# Patient Record
Sex: MALE | HISPANIC OR LATINO | Employment: OTHER | URBAN - NONMETROPOLITAN AREA
[De-identification: names, ages, dates, MRNs, and addresses within clinical notes are randomized per-mention and may not be internally consistent; named-entity substitution may affect disease eponyms.]

---

## 2020-10-27 ENCOUNTER — OFFICE VISIT (OUTPATIENT)
Dept: ENDOCRINOLOGY | Facility: CLINIC | Age: 45
End: 2020-10-27

## 2020-10-27 VITALS
BODY MASS INDEX: 35.49 KG/M2 | SYSTOLIC BLOOD PRESSURE: 122 MMHG | WEIGHT: 247.9 LBS | HEIGHT: 70 IN | OXYGEN SATURATION: 99 % | HEART RATE: 65 BPM | DIASTOLIC BLOOD PRESSURE: 82 MMHG

## 2020-10-27 DIAGNOSIS — N52.9 ERECTILE DYSFUNCTION, UNSPECIFIED ERECTILE DYSFUNCTION TYPE: ICD-10-CM

## 2020-10-27 DIAGNOSIS — E29.1 HYPOGONADISM IN MALE: Primary | ICD-10-CM

## 2020-10-27 DIAGNOSIS — N46.11 OLIGOSPERMIA: ICD-10-CM

## 2020-10-27 PROCEDURE — 99204 OFFICE O/P NEW MOD 45 MIN: CPT | Performed by: INTERNAL MEDICINE

## 2020-10-27 RX ORDER — AMLODIPINE BESYLATE 10 MG/1
10 TABLET ORAL DAILY
COMMUNITY

## 2020-10-27 RX ORDER — TADALAFIL 20 MG/1
20 TABLET ORAL DAILY PRN
Qty: 30 TABLET | Refills: 11 | Status: SHIPPED | OUTPATIENT
Start: 2020-10-27 | End: 2021-08-25 | Stop reason: SDUPTHER

## 2020-10-27 RX ORDER — METHOCARBAMOL 500 MG/1
500 TABLET, FILM COATED ORAL 4 TIMES DAILY
COMMUNITY
End: 2021-12-16 | Stop reason: SDUPTHER

## 2020-10-27 RX ORDER — TRAZODONE HYDROCHLORIDE 100 MG/1
100 TABLET ORAL NIGHTLY
COMMUNITY
End: 2020-12-21

## 2020-10-27 RX ORDER — PRAZOSIN HYDROCHLORIDE 5 MG/1
5 CAPSULE ORAL NIGHTLY
COMMUNITY
End: 2022-11-21 | Stop reason: SDUPTHER

## 2020-10-27 RX ORDER — SERTRALINE HYDROCHLORIDE 100 MG/1
100 TABLET, FILM COATED ORAL DAILY
COMMUNITY
End: 2022-08-25 | Stop reason: SDUPTHER

## 2020-10-27 RX ORDER — SUMATRIPTAN 100 MG/1
100 TABLET, FILM COATED ORAL
COMMUNITY
End: 2021-08-25 | Stop reason: SDUPTHER

## 2020-10-27 RX ORDER — DIPHENOXYLATE HYDROCHLORIDE AND ATROPINE SULFATE 2.5; .025 MG/1; MG/1
1 TABLET ORAL DAILY
COMMUNITY
End: 2022-11-21 | Stop reason: SDUPTHER

## 2020-10-27 RX ORDER — IBUPROFEN 600 MG/1
600 TABLET ORAL EVERY 6 HOURS PRN
COMMUNITY
End: 2021-08-25 | Stop reason: SDUPTHER

## 2020-10-27 RX ORDER — HYDROXYZINE HYDROCHLORIDE 25 MG/1
25 TABLET, FILM COATED ORAL 3 TIMES DAILY PRN
COMMUNITY
End: 2022-11-21 | Stop reason: SDUPTHER

## 2020-10-27 RX ORDER — LISINOPRIL 20 MG/1
20 TABLET ORAL DAILY
COMMUNITY
End: 2022-04-28 | Stop reason: SDUPTHER

## 2020-10-29 ENCOUNTER — TELEPHONE (OUTPATIENT)
Dept: ENDOCRINOLOGY | Facility: CLINIC | Age: 45
End: 2020-10-29

## 2020-11-30 PROBLEM — N46.11 OLIGOSPERMIA: Status: ACTIVE | Noted: 2020-11-30

## 2020-11-30 PROBLEM — I10 ESSENTIAL HYPERTENSION: Status: ACTIVE | Noted: 2020-11-30

## 2020-11-30 PROBLEM — E29.1 HYPOGONADISM IN MALE: Status: ACTIVE | Noted: 2020-11-30

## 2020-12-15 ENCOUNTER — LAB (OUTPATIENT)
Dept: LAB | Facility: HOSPITAL | Age: 45
End: 2020-12-15

## 2020-12-15 DIAGNOSIS — N46.11 OLIGOSPERMIA: ICD-10-CM

## 2020-12-15 LAB
ALBUMIN SERPL-MCNC: 4.4 G/DL (ref 3.5–5.2)
ALBUMIN/GLOB SERPL: 1.4 G/DL
ALP SERPL-CCNC: 76 U/L (ref 39–117)
ALT SERPL W P-5'-P-CCNC: 44 U/L (ref 1–41)
ANION GAP SERPL CALCULATED.3IONS-SCNC: 9.2 MMOL/L (ref 5–15)
AST SERPL-CCNC: 26 U/L (ref 1–40)
BILIRUB SERPL-MCNC: 0.2 MG/DL (ref 0–1.2)
BUN SERPL-MCNC: 17 MG/DL (ref 6–20)
BUN/CREAT SERPL: 18.1 (ref 7–25)
CALCIUM SPEC-SCNC: 9.2 MG/DL (ref 8.6–10.5)
CHLORIDE SERPL-SCNC: 104 MMOL/L (ref 98–107)
CO2 SERPL-SCNC: 26.8 MMOL/L (ref 22–29)
CREAT SERPL-MCNC: 0.94 MG/DL (ref 0.76–1.27)
FSH SERPL-ACNC: 2.38 MIU/ML
GFR SERPL CREATININE-BSD FRML MDRD: 105 ML/MIN/1.73
GFR SERPL CREATININE-BSD FRML MDRD: 87 ML/MIN/1.73
GLOBULIN UR ELPH-MCNC: 3.2 GM/DL
GLUCOSE SERPL-MCNC: 130 MG/DL (ref 65–99)
HCT VFR BLD AUTO: 44.3 % (ref 37.5–51)
LH SERPL-ACNC: 3.63 MIU/ML
POTASSIUM SERPL-SCNC: 4.3 MMOL/L (ref 3.5–5.2)
PROLACTIN SERPL-MCNC: 14.1 NG/ML (ref 4.04–15.2)
PROT SERPL-MCNC: 7.6 G/DL (ref 6–8.5)
SODIUM SERPL-SCNC: 140 MMOL/L (ref 136–145)
TESTOST SERPL-MCNC: 220 NG/DL (ref 249–836)

## 2020-12-15 PROCEDURE — 84146 ASSAY OF PROLACTIN: CPT | Performed by: INTERNAL MEDICINE

## 2020-12-15 PROCEDURE — 89320 SEMEN ANAL VOL/COUNT/MOT: CPT

## 2020-12-15 PROCEDURE — 80053 COMPREHEN METABOLIC PANEL: CPT | Performed by: INTERNAL MEDICINE

## 2020-12-15 PROCEDURE — 84410 TESTOSTERONE BIOAVAILABLE: CPT | Performed by: INTERNAL MEDICINE

## 2020-12-15 PROCEDURE — 36415 COLL VENOUS BLD VENIPUNCTURE: CPT | Performed by: INTERNAL MEDICINE

## 2020-12-15 PROCEDURE — 85014 HEMATOCRIT: CPT | Performed by: INTERNAL MEDICINE

## 2020-12-15 PROCEDURE — 83001 ASSAY OF GONADOTROPIN (FSH): CPT | Performed by: INTERNAL MEDICINE

## 2020-12-15 PROCEDURE — 84270 ASSAY OF SEX HORMONE GLOBUL: CPT | Performed by: INTERNAL MEDICINE

## 2020-12-15 PROCEDURE — 83002 ASSAY OF GONADOTROPIN (LH): CPT | Performed by: INTERNAL MEDICINE

## 2020-12-15 PROCEDURE — 84403 ASSAY OF TOTAL TESTOSTERONE: CPT | Performed by: INTERNAL MEDICINE

## 2020-12-16 LAB
CHARACTER SMN: NORMAL
LIQUEFACTION TIME SMN: 60 MINUTES (ref 0–60)
PH SMN: 8 [PH] (ref 6–8)
SEX ABSTIN DURATION TIME PATIENT: NORMAL DAYS
SHBG SERPL-SCNC: 9.3 NMOL/L (ref 16.5–55.9)
SPECIMEN VOL SMN: 1.5 ML (ref 1.5–5)
SPERM # SMN: 148 MILLIONS/ML (ref 60–150)
SPERM MOTILE NFR SMN: 90 % MOTILE (ref 60–100)
SPERM SMN: 71 % NORMAL (ref 70–100)
VISC SMN: NORMAL CP

## 2020-12-21 ENCOUNTER — OFFICE VISIT (OUTPATIENT)
Dept: ENDOCRINOLOGY | Facility: CLINIC | Age: 45
End: 2020-12-21

## 2020-12-21 VITALS
HEIGHT: 70 IN | BODY MASS INDEX: 36.09 KG/M2 | OXYGEN SATURATION: 98 % | DIASTOLIC BLOOD PRESSURE: 86 MMHG | WEIGHT: 252.1 LBS | HEART RATE: 74 BPM | SYSTOLIC BLOOD PRESSURE: 118 MMHG

## 2020-12-21 DIAGNOSIS — R79.89 LOW TESTOSTERONE IN MALE: Primary | ICD-10-CM

## 2020-12-21 DIAGNOSIS — G47.33 OSA (OBSTRUCTIVE SLEEP APNEA): ICD-10-CM

## 2020-12-21 PROCEDURE — 99214 OFFICE O/P EST MOD 30 MIN: CPT | Performed by: INTERNAL MEDICINE

## 2020-12-21 RX ORDER — MIRTAZAPINE 15 MG/1
15 TABLET, FILM COATED ORAL NIGHTLY
COMMUNITY
End: 2022-11-21 | Stop reason: SDUPTHER

## 2020-12-21 NOTE — PROGRESS NOTES
Hank Peacock is a 45 y.o. male who presents for  evaluation of   Chief Complaint   Patient presents with   • Follow-up     2 mo jaja        Referring provider      Primary Care Provider    Karuna Doty MD     Male Hypogonadism       Duration - 5 years     Quality - Testosterone Fasting , 8 am , 200 , repeat 220     Current symptoms/problems - Fatigue, Weakness, low libido , ED, weight gain     Alleviating Factors - none    Aggravating Factors - none       Past Medical History:   Diagnosis Date   • Essential hypertension 11/30/2020   • Hypogonadism in male 11/30/2020   • Oligospermia 11/30/2020     Family History   Problem Relation Age of Onset   • Diabetes Mother      Social History     Tobacco Use   • Smoking status: Never Smoker   • Smokeless tobacco: Never Used   Substance Use Topics   • Alcohol use: Not on file   • Drug use: Not on file         Current Outpatient Medications:   •  amLODIPine (NORVASC) 10 MG tablet, Take 10 mg by mouth Daily., Disp: , Rfl:   •  hydrOXYzine (ATARAX) 25 MG tablet, Take 25 mg by mouth 3 (Three) Times a Day As Needed for Itching., Disp: , Rfl:   •  ibuprofen (ADVIL,MOTRIN) 600 MG tablet, Take 600 mg by mouth Every 6 (Six) Hours As Needed for Mild Pain ., Disp: , Rfl:   •  lisinopril (PRINIVIL,ZESTRIL) 20 MG tablet, Take 20 mg by mouth Daily., Disp: , Rfl:   •  methocarbamol (ROBAXIN) 500 MG tablet, Take 500 mg by mouth 4 (Four) Times a Day., Disp: , Rfl:   •  mirtazapine (REMERON) 15 MG tablet, Take 15 mg by mouth Every Night., Disp: , Rfl:   •  multivitamin (MULTI-VITAMIN PO), Take 1 tablet by mouth Daily., Disp: , Rfl:   •  prazosin (MINIPRESS) 5 MG capsule, Take 5 mg by mouth Every Night., Disp: , Rfl:   •  sertraline (ZOLOFT) 100 MG tablet, Take 100 mg by mouth Daily., Disp: , Rfl:   •  SUMAtriptan (IMITREX) 100 MG tablet, Take 100 mg by mouth Every 2 (Two) Hours As Needed for Migraine. Take one tablet at onset of headache. May repeat dose one time in 2 hours if headache not  relieved., Disp: , Rfl:   •  tadalafil (CIALIS) 20 MG tablet, Take 1 tablet by mouth Daily As Needed for Erectile Dysfunction., Disp: 30 tablet, Rfl: 11  •  clomiPHENE (CLOMID) 50 MG tablet, Take 0.5 tablets by mouth Every Other Day., Disp: 10 tablet, Rfl: 3    Review of Systems    Review of Systems   Constitutional: Positive for fatigue and unexpected weight change. Negative for activity change, appetite change, chills, diaphoresis and fever.   HENT: Negative for congestion, dental problem, drooling, ear discharge, ear pain, facial swelling, mouth sores, postnasal drip, rhinorrhea, sinus pressure, sore throat, tinnitus, trouble swallowing and voice change.    Eyes: Negative for photophobia, pain, discharge, redness, itching and visual disturbance.   Respiratory: Negative for apnea, cough, choking, chest tightness, shortness of breath, wheezing and stridor.    Cardiovascular: Negative for chest pain, palpitations and leg swelling.   Gastrointestinal: Negative for abdominal distention, abdominal pain, constipation, diarrhea, nausea and vomiting.   Endocrine: Negative for cold intolerance, heat intolerance, polydipsia, polyphagia and polyuria.   Genitourinary: Negative for decreased urine volume, difficulty urinating, dysuria, flank pain, frequency, hematuria and urgency.   Musculoskeletal: Negative for arthralgias, back pain, gait problem, joint swelling, myalgias, neck pain and neck stiffness.   Skin: Negative for color change, pallor, rash and wound.   Allergic/Immunologic: Negative for immunocompromised state.   Neurological: Positive for weakness. Negative for dizziness, tremors, seizures, syncope, facial asymmetry, speech difficulty, light-headedness, numbness and headaches.   Hematological: Negative for adenopathy.   Psychiatric/Behavioral: Negative for agitation, behavioral problems, confusion, decreased concentration, dysphoric mood, hallucinations, self-injury, sleep disturbance and suicidal ideas. The  "patient is not nervous/anxious and is not hyperactive.         Objective:   /86 (BP Location: Right arm, Patient Position: Sitting, Cuff Size: Large Adult)   Pulse 74   Ht 177.8 cm (70\")   Wt 114 kg (252 lb 1.6 oz)   SpO2 98%   BMI 36.17 kg/m²     Physical Exam  Vitals signs and nursing note reviewed.   Constitutional:       Appearance: He is well-developed.   HENT:      Head: Normocephalic and atraumatic.      Right Ear: External ear normal.      Left Ear: External ear normal.      Nose: Nose normal.      Mouth/Throat:      Pharynx: No oropharyngeal exudate.   Eyes:      General: No scleral icterus.     Extraocular Movements:      Right eye: Normal extraocular motion.      Left eye: Normal extraocular motion.      Conjunctiva/sclera: Conjunctivae normal.      Pupils: Pupils are equal, round, and reactive to light.   Neck:      Musculoskeletal: Neck supple. No edema, erythema or muscular tenderness.      Thyroid: No thyromegaly.      Vascular: No JVD.      Trachea: No tracheal deviation.   Cardiovascular:      Rate and Rhythm: Normal rate and regular rhythm.      Heart sounds: Normal heart sounds. No murmur. No friction rub. No gallop.    Pulmonary:      Effort: Pulmonary effort is normal. No respiratory distress.      Breath sounds: Normal breath sounds. No stridor. No decreased breath sounds, wheezing, rhonchi or rales.   Chest:      Chest wall: No tenderness.   Abdominal:      General: Bowel sounds are normal. There is no distension.      Palpations: Abdomen is soft. There is no hepatomegaly or mass.      Tenderness: There is no abdominal tenderness. There is no guarding or rebound.      Hernia: No hernia is present.   Musculoskeletal: Normal range of motion.         General: No tenderness or deformity.   Lymphadenopathy:      Cervical: No cervical adenopathy.   Skin:     General: Skin is warm.      Coloration: Skin is not pale.      Findings: No erythema or rash.   Neurological:      Mental Status: He " is alert and oriented to person, place, and time.      Cranial Nerves: No cranial nerve deficit.      Motor: No abnormal muscle tone.      Coordination: Coordination normal.      Deep Tendon Reflexes: Reflexes are normal and symmetric.   Psychiatric:         Behavior: Behavior normal. Behavior is cooperative.         Thought Content: Thought content normal.         Judgment: Judgment normal.         Lab Review    Results for orders placed or performed in visit on 12/15/20   Semen Analysis - Semen, Voided    Specimen: Voided; Semen   Result Value Ref Range    Abstinence Period 2 - 7  Days 2 - 7  Days Days    Volume, Semen 1.5 1.5 - 5.0 mL    Viscosity Normal Normal    Motility 90 60 - 100 % Motile    Sperm Morphology 71.0 70.0 - 100.0 % Normal    Sperm, Total Count 148.0 60.0 - 150.0 Millions/mL    Appearance Normal Normal    pH Seminal Fluid 8.0 6.0 - 8.0    Liquefaction Time 60 0 - 60 minutes         Assessment/Plan       ICD-10-CM ICD-9-CM   1. Low testosterone in male  R79.89 790.99   2. WILLA (obstructive sleep apnea)  G47.33 327.23         I reviewed and summarized records from Karuna Doty MD from current year  and I reviewed / ordered labs.   From review of records :    Pt has male hypogonadism     Interested in fertility . Hasn't been able to conceive with wife for the past 2 years    Semen analysis is normal     Start clomiphene      No orders of the defined types were placed in this encounter.        A copy of my note was sent to Karuna Doty MD    Please see my above opinion and suggestions.             This document has been electronically signed by Aldair Collins MD on December 21, 2020 16:06 CST

## 2020-12-25 LAB
TESTOST SERPL-MCNC: 253 NG/DL
TESTOSTERONE.FREE+WB MFR SERPL: 72.5 %
TESTOSTERONE.FREE+WB SERPL-MCNC: 183 NG/DL

## 2021-08-24 NOTE — PROGRESS NOTES
Subjective:  Hank Peacock is a 46 y.o. male who presents for       Patient Active Problem List   Diagnosis   • Hypogonadism in male   • Oligospermia   • Essential hypertension   • Class 2 severe obesity with serious comorbidity and body mass index (BMI) of 35.0 to 35.9 in adult (CMS/HCC)   • Migraine without status migrainosus, not intractable   • Moderate episode of recurrent major depressive disorder (CMS/HCC)           Current Outpatient Medications:   •  amLODIPine (NORVASC) 10 MG tablet, Take 10 mg by mouth Daily., Disp: , Rfl:   •  hydrOXYzine (ATARAX) 25 MG tablet, Take 25 mg by mouth 3 (Three) Times a Day As Needed for Itching., Disp: , Rfl:   •  ibuprofen (ADVIL,MOTRIN) 600 MG tablet, Take 1 tablet by mouth Every 6 (Six) Hours As Needed for Mild Pain ., Disp: 30 tablet, Rfl: 3  •  lisinopril (PRINIVIL,ZESTRIL) 20 MG tablet, Take 20 mg by mouth Daily., Disp: , Rfl:   •  methocarbamol (ROBAXIN) 500 MG tablet, Take 500 mg by mouth 4 (Four) Times a Day., Disp: , Rfl:   •  mirtazapine (REMERON) 15 MG tablet, Take 15 mg by mouth Every Night., Disp: , Rfl:   •  multivitamin (MULTI-VITAMIN PO), Take 1 tablet by mouth Daily., Disp: , Rfl:   •  prazosin (MINIPRESS) 5 MG capsule, Take 5 mg by mouth Every Night., Disp: , Rfl:   •  sertraline (ZOLOFT) 100 MG tablet, Take 100 mg by mouth Daily., Disp: , Rfl:   •  SUMAtriptan (IMITREX) 100 MG tablet, Take 1 tablet by mouth Every 2 (Two) Hours As Needed for Migraine. Take one tablet at onset of headache. May repeat dose one time in 2 hours if headache, Disp: 10 tablet, Rfl: 3  •  tadalafil (CIALIS) 20 MG tablet, Take 1 tablet by mouth Daily As Needed for Erectile Dysfunction., Disp: 30 tablet, Rfl: 11    Pt is 47 yo male with management of obesity, major depression, HTN, HLP, ED, migraine headaches, CATA,  Hypogonadism oligospermia, low testosterone, panic attack, PTSD, WILLA on CPAP     8/25/21 [facility] is here to [facility] He is from [facility]  [place] and grew in [place]. He served 20 years with [employer] and is currently retired.   He has a CMH of HTn and is on lisionpril 20 mg daily along with norvasc 10 mg dialy. For major depression/CATA pt is on zoloft 100 mg daily along with remeron 15 mg PO qhs and minipress 5 mg PO qhs  For anxiety he takes vistaril PRN. [facility]. A lot of his mental health issues is from serving in .  He served in [place] and [place]. He also suffers from panic attacks and PTSD.    He suffers from migraine headaches and is on imitrex as needed. For ED pt is on cialis 20 mg as needed. Hew as seeing Dr. Alvarado with Endocrinology for oligospermia, hypogonadism and low testosterone. .he takes cialis for ED issues. He is happily  for 6 years.   He has history of 2 divorces in past. He has 1 biological child and another child on the way.  No tobacco use no illicit drug use.  No alcohol use.  He also suffers from migraines and takes imitrex PRN.   He was injured while serving in . He also suffers from back and hip pain. Including pain in shoulders.  He has had no major surgeries.   Family history  Includes mother and father alive.       Hypertension  This is a chronic problem. The current episode started more than 1 year ago. The problem is unchanged. The problem is uncontrolled. Pertinent negatives include no anxiety, blurred vision, chest pain, headaches or shortness of breath. Risk factors for coronary artery disease include male gender, obesity and dyslipidemia. Past treatments include ACE inhibitors. Current antihypertension treatment includes ACE inhibitors. The current treatment provides no improvement. There is no history of angina, kidney disease, CAD/MI, CVA, heart failure, left ventricular hypertrophy, PVD or retinopathy. There is no history of chronic renal disease, coarctation of the aorta, hyperaldosteronism, hypercortisolism, hyperparathyroidism, a hypertension causing med,  pheochromocytoma, renovascular disease, sleep apnea or a thyroid problem.   Depression  Visit Type: initial  Onset of symptoms: at an unknown time  Progression since onset: waxing and waning  Patient presents with the following symptoms: anhedonia, depressed mood and nervousness/anxiety.  Patient is not experiencing: chest pain, choking sensation, compulsions, confusion, decreased concentration, dry mouth, excessive worry, fatigue, feelings of hopelessness, feelings of worthlessness, hypersomnia, hyperventilation, impotence, insomnia, irritability, malaise, memory impairment, muscle tension, nausea, obsessions, shortness of breath and weight loss.  Severity: moderate   Sleep quality: fair  Nighttime awakenings: none  Patient has a history of: anxiety/panic attacks and depression  No history of: anemia, arrhythmia, asthma, bipolar disorder, CHF, chronic lung disease and fibromyalgia  Treatment tried: SSRI  Compliance with treatment: variable  Improvement on treatment: moderate      Migraine   This is a chronic problem. The current episode started more than 1 year ago. The problem occurs constantly. The problem has been unchanged. The quality of the pain is described as aching. Pertinent negatives include no blurred vision, coughing, fever, insomnia, nausea, numbness, rhinorrhea, sinus pressure, sore throat, vomiting, weakness or weight loss. The symptoms are aggravated by activity. Treatments tried: imitrex  The treatment provided mild relief. His past medical history is significant for hypertension and obesity. There is no history of cancer, cluster headaches, immunosuppression, migraine headaches, migraines in the family or pseudotumor cerebri. (Oligospermia, hyopgonadism )   Male  Problem  This is a recurrent problem. The problem occurs constantly. The problem has been unchanged. Pertinent negatives include no chest pain, chills, coughing, diarrhea, fever, headaches, nausea, shortness of breath, sore throat or  vomiting. There is no history of BPH, chlamydia, cryptorchidism, erectile aid use, erectile dysfunction, a femoral hernia, gonorrhea, herpes simplex, HIV, an inguinal hernia, kidney stones, prostatitis, sickle cell disease, syphilis or varicocele. (Oligospermia, hyopgonadism )   Obesity  This is a chronic problem. The current episode started more than 1 year ago. The problem occurs constantly. The problem has been unchanged. Associated symptoms include arthralgias and fatigue. Pertinent negatives include no chest pain, chills, congestion, coughing, diaphoresis, fever, headaches, nausea, numbness, sore throat, vomiting or weakness. Nothing aggravates the symptoms. The treatment provided no relief.       Review of Systems  Review of Systems   Constitutional: Positive for activity change and fatigue. Negative for appetite change, chills, diaphoresis, fever, irritability and weight loss.   HENT: Negative for congestion, postnasal drip, rhinorrhea, sinus pressure, sinus pain, sneezing, sore throat, trouble swallowing and voice change.    Eyes: Negative for blurred vision.   Respiratory: Negative for cough, choking, chest tightness, shortness of breath, wheezing and stridor.    Cardiovascular: Negative for chest pain.   Gastrointestinal: Negative for diarrhea, nausea and vomiting.   Genitourinary: Negative for impotence.   Musculoskeletal: Positive for arthralgias.   Neurological: Negative for weakness, numbness and headaches.   Psychiatric/Behavioral: Positive for behavioral problems. Negative for confusion and decreased concentration. The patient is nervous/anxious. The patient does not have insomnia.        Patient Active Problem List   Diagnosis   • Hypogonadism in male   • Oligospermia   • Essential hypertension   • Class 2 severe obesity with serious comorbidity and body mass index (BMI) of 35.0 to 35.9 in adult (CMS/[facility])   • Migraine without status migrainosus, not intractable   • Moderate episode of recurrent  "major depressive disorder (CMS/HCC)     History reviewed. No pertinent surgical history.  Social History     Socioeconomic History   • Marital status:      Spouse name: Not on file   • Number of children: Not on file   • Years of education: Not on file   • Highest education level: Not on file   Tobacco Use   • Smoking status: Never Smoker   • Smokeless tobacco: Never Used   Substance and Sexual Activity   • Alcohol use: Not Currently   • Drug use: Not Currently   • Sexual activity: Defer     Family History   Problem Relation Age of Onset   • Diabetes Mother      No visits with results within 6 Month(s) from this visit.   Latest known visit with results is:   Lab on 12/15/2020   Component Date Value Ref Range Status   • Abstinence Period 12/15/2020 2 - 7  Days  2 - 7  Days Days Final   • Volume, Semen 12/15/2020 1.5  1.5 - 5.0 mL Final   • Viscosity 12/15/2020 Normal  Normal Final    10-15 WBC/HPF SEEN   • Motility 12/15/2020 90  60 - 100 % Motile Final   • Sperm Morphology 12/15/2020 71.0  70.0 - 100.0 % Normal Final   • Sperm, Total Count 12/15/2020 148.0  60.0 - 150.0 Millions/mL Final    The W.H.O. classification method has been used for this semen analysis.   • Appearance 12/15/2020 Normal  Normal Final   • pH Seminal Fluid 12/15/2020 8.0  6.0 - 8.0 Final   • Liquefaction Time 12/15/2020 60  0 - 60 minutes Final      No image results found.    [unfilled]  Immunization History   Administered Date(s) Administered   • COVID-19 (MODERNA) 04/22/2021, 05/18/2021       The following portions of the patient's history were reviewed and updated as appropriate: allergies, current medications, past family history, past medical history, past social history, past surgical history and problem list.        Physical Exam  /76 (BP Location: Right arm, Patient Position: Sitting, Cuff Size: Large Adult)   Pulse 72   Temp 97.6 °F (36.4 °C)   Ht 177.8 cm (70\")   Wt 113 kg (249 lb 12.8 oz)   SpO2 98%   BMI 35.84 " kg/m²     Physical Exam  Vitals and nursing note reviewed.   Constitutional:       Appearance: He is well-developed. He is not diaphoretic.   HENT:      Head: Normocephalic and atraumatic.      Right Ear: External ear normal.   Eyes:      Conjunctiva/sclera: Conjunctivae normal.      Pupils: Pupils are equal, round, and reactive to light.   Cardiovascular:      Rate and Rhythm: Normal rate and regular rhythm.      Heart sounds: Normal heart sounds. No murmur heard.     Pulmonary:      Effort: Pulmonary effort is normal. No respiratory distress.      Breath sounds: Normal breath sounds.   Abdominal:      General: Bowel sounds are normal. There is no distension.      Palpations: Abdomen is soft.      Tenderness: There is no abdominal tenderness.      Comments: Obese abdomen    Musculoskeletal:         General: No deformity. Normal range of motion.      Cervical back: Normal range of motion and neck supple.   Skin:     General: Skin is warm.      Coloration: Skin is not pale.      Findings: No erythema or rash.   Neurological:      Mental Status: He is alert and oriented to person, place, and time.      Cranial Nerves: No cranial nerve deficit.   Psychiatric:         Behavior: Behavior normal.         Assessment/Plan    Diagnosis Plan   1. Encounter for screening colonoscopy  Ambulatory Referral to Gastroenterology   2. Essential hypertension  CBC Auto Differential    Comprehensive Metabolic Panel    Hemoglobin A1c    Lipid Panel    TSH    T4, Free    T3, Free    Vitamin D 25 Hydroxy    Vitamin B12   3. Mixed hyperlipidemia  CBC Auto Differential    Comprehensive Metabolic Panel    Hemoglobin A1c    Lipid Panel    TSH    T4, Free    T3, Free    Vitamin D 25 Hydroxy    Vitamin B12   4. Encounter for screening for endocrine disorder  CBC Auto Differential    Comprehensive Metabolic Panel    Hemoglobin A1c    Lipid Panel    TSH    T4, Free    T3, Free    Vitamin D 25 Hydroxy    Vitamin B12   5. Moderate episode of  recurrent major depressive disorder (CMS/HCC)  CBC Auto Differential    Comprehensive Metabolic Panel    Hemoglobin A1c    Lipid Panel    TSH    T4, Free    T3, Free    Vitamin D 25 Hydroxy    Vitamin B12   6. Migraine without status migrainosus, not intractable, unspecified migraine type  CBC Auto Differential    Comprehensive Metabolic Panel    Hemoglobin A1c    Lipid Panel    TSH    T4, Free    T3, Free    Vitamin D 25 Hydroxy    Vitamin B12   7. Annual physical exam  CBC Auto Differential    Comprehensive Metabolic Panel    Hemoglobin A1c    Lipid Panel    TSH    T4, Free    T3, Free    Vitamin D 25 Hydroxy    Vitamin B12   8. General medical examination  CBC Auto Differential    Comprehensive Metabolic Panel    Hemoglobin A1c    Lipid Panel    TSH    T4, Free    T3, Free    Vitamin D 25 Hydroxy    Vitamin B12   9. Class 2 severe obesity with serious comorbidity and body mass index (BMI) of 35.0 to 35.9 in adult, unspecified obesity type (CMS/HCC)  CBC Auto Differential    Comprehensive Metabolic Panel    Hemoglobin A1c    Lipid Panel    TSH    T4, Free    T3, Free    Vitamin D 25 Hydroxy    Vitamin B12   10. Need for hepatitis C screening test  CBC Auto Differential    Comprehensive Metabolic Panel    Hemoglobin A1c    Lipid Panel    TSH    T4, Free    T3, Free    Vitamin D 25 Hydroxy    Vitamin B12    Hepatitis C Antibody   11. Erectile dysfunction, unspecified erectile dysfunction type     12. Multiple joint pain  Ambulatory Referral to Chiropractic        -recommend labwork  -annual physical exam today  -recommend tdap vaccination  -recommend COVID-19 vaccination  -recommend [facility]oscopy screening - [facility]   -hep C screening - hep C antibody test   -WILLA on cpap   -multiple joint pain - on ibuprofen PRN, on robaxin at bedtime. Will refer to chiorpractor   -obesity - counseled weight loss >5 mintues BMI at 35.84   -HTN - on lisinopril on norvasc  -HLP - check lipid panel  -major depression/PTSD/CATA -  on zoloft 100 mg daily on vistaril PRN on remeron 15 mg PO q daily.   -migraine - imitrex PRN   -ED - on cialis PRN   -oligospermia/hyopogonadism/low testosterone - was seeing Endocirnology   -advised pt to be safe and call with questions and concerns  -advised pt to go to ER or call 911 if symptoms worrisome or severe  -advised pt to followup with specialist and referrals  -advised pt to be safe during COVID-19 pandemic  I spent 45 minutes caring for Hank on this date of service. This time includes time spent by me in the following activities: preparing for the visit, reviewing tests, obtaining and/or reviewing a separately obtained history, performing a medically appropriate examination and/or evaluation, counseling and educating the patient/family/caregiver, ordering medications, tests, or procedures, referring and communicating with other health care professionals, documenting information in the medical record, independently interpreting results and communicating that information with the patient/family/caregiver and care coordination.   -recheck in 4 weeks         This document has been electronically signed by Clarence Sadler MD on August 25, 2021 08:55 CDT

## 2021-08-25 ENCOUNTER — OFFICE VISIT (OUTPATIENT)
Dept: FAMILY MEDICINE CLINIC | Facility: CLINIC | Age: 46
End: 2021-08-25

## 2021-08-25 VITALS
HEART RATE: 72 BPM | WEIGHT: 249.8 LBS | DIASTOLIC BLOOD PRESSURE: 76 MMHG | OXYGEN SATURATION: 98 % | SYSTOLIC BLOOD PRESSURE: 134 MMHG | TEMPERATURE: 97.6 F | HEIGHT: 70 IN | BODY MASS INDEX: 35.76 KG/M2

## 2021-08-25 DIAGNOSIS — M25.50 MULTIPLE JOINT PAIN: ICD-10-CM

## 2021-08-25 DIAGNOSIS — Z12.11 ENCOUNTER FOR SCREENING COLONOSCOPY: Primary | ICD-10-CM

## 2021-08-25 DIAGNOSIS — Z00.00 GENERAL MEDICAL EXAMINATION: ICD-10-CM

## 2021-08-25 DIAGNOSIS — E78.2 MIXED HYPERLIPIDEMIA: ICD-10-CM

## 2021-08-25 DIAGNOSIS — G43.909 MIGRAINE WITHOUT STATUS MIGRAINOSUS, NOT INTRACTABLE, UNSPECIFIED MIGRAINE TYPE: ICD-10-CM

## 2021-08-25 DIAGNOSIS — Z00.00 ANNUAL PHYSICAL EXAM: ICD-10-CM

## 2021-08-25 DIAGNOSIS — Z11.59 NEED FOR HEPATITIS C SCREENING TEST: ICD-10-CM

## 2021-08-25 DIAGNOSIS — E66.01 CLASS 2 SEVERE OBESITY WITH SERIOUS COMORBIDITY AND BODY MASS INDEX (BMI) OF 35.0 TO 35.9 IN ADULT, UNSPECIFIED OBESITY TYPE (HCC): ICD-10-CM

## 2021-08-25 DIAGNOSIS — F33.1 MODERATE EPISODE OF RECURRENT MAJOR DEPRESSIVE DISORDER (HCC): ICD-10-CM

## 2021-08-25 DIAGNOSIS — N52.9 ERECTILE DYSFUNCTION, UNSPECIFIED ERECTILE DYSFUNCTION TYPE: ICD-10-CM

## 2021-08-25 DIAGNOSIS — Z13.29 ENCOUNTER FOR SCREENING FOR ENDOCRINE DISORDER: ICD-10-CM

## 2021-08-25 DIAGNOSIS — I10 ESSENTIAL HYPERTENSION: ICD-10-CM

## 2021-08-25 PROCEDURE — 99204 OFFICE O/P NEW MOD 45 MIN: CPT | Performed by: FAMILY MEDICINE

## 2021-08-25 RX ORDER — SUMATRIPTAN 100 MG/1
100 TABLET, FILM COATED ORAL
Qty: 10 TABLET | Refills: 3 | Status: SHIPPED | OUTPATIENT
Start: 2021-08-25 | End: 2021-12-16 | Stop reason: SDUPTHER

## 2021-08-25 RX ORDER — TADALAFIL 20 MG/1
20 TABLET ORAL DAILY PRN
Qty: 30 TABLET | Refills: 11 | Status: SHIPPED | OUTPATIENT
Start: 2021-08-25 | End: 2022-04-28

## 2021-08-25 RX ORDER — IBUPROFEN 600 MG/1
600 TABLET ORAL EVERY 6 HOURS PRN
Qty: 30 TABLET | Refills: 3 | Status: SHIPPED | OUTPATIENT
Start: 2021-08-25 | End: 2022-08-09 | Stop reason: SDUPTHER

## 2021-08-27 ENCOUNTER — LAB (OUTPATIENT)
Dept: LAB | Facility: HOSPITAL | Age: 46
End: 2021-08-27

## 2021-08-27 DIAGNOSIS — Z11.59 NEED FOR HEPATITIS C SCREENING TEST: ICD-10-CM

## 2021-08-27 DIAGNOSIS — F33.1 MODERATE EPISODE OF RECURRENT MAJOR DEPRESSIVE DISORDER (HCC): ICD-10-CM

## 2021-08-27 DIAGNOSIS — Z00.00 GENERAL MEDICAL EXAMINATION: ICD-10-CM

## 2021-08-27 DIAGNOSIS — Z13.29 ENCOUNTER FOR SCREENING FOR ENDOCRINE DISORDER: ICD-10-CM

## 2021-08-27 DIAGNOSIS — I10 ESSENTIAL HYPERTENSION: ICD-10-CM

## 2021-08-27 DIAGNOSIS — E66.01 CLASS 2 SEVERE OBESITY WITH SERIOUS COMORBIDITY AND BODY MASS INDEX (BMI) OF 35.0 TO 35.9 IN ADULT, UNSPECIFIED OBESITY TYPE (HCC): ICD-10-CM

## 2021-08-27 DIAGNOSIS — Z00.00 ANNUAL PHYSICAL EXAM: ICD-10-CM

## 2021-08-27 DIAGNOSIS — G43.909 MIGRAINE WITHOUT STATUS MIGRAINOSUS, NOT INTRACTABLE, UNSPECIFIED MIGRAINE TYPE: ICD-10-CM

## 2021-08-27 DIAGNOSIS — E78.2 MIXED HYPERLIPIDEMIA: ICD-10-CM

## 2021-08-27 LAB
25(OH)D3 SERPL-MCNC: 35.3 NG/ML (ref 30–100)
ALBUMIN SERPL-MCNC: 4.5 G/DL (ref 3.5–5.2)
ALBUMIN/GLOB SERPL: 1.5 G/DL
ALP SERPL-CCNC: 76 U/L (ref 39–117)
ALT SERPL W P-5'-P-CCNC: 33 U/L (ref 1–41)
ANION GAP SERPL CALCULATED.3IONS-SCNC: 10.2 MMOL/L (ref 5–15)
AST SERPL-CCNC: 23 U/L (ref 1–40)
BASOPHILS # BLD AUTO: 0.04 10*3/MM3 (ref 0–0.2)
BASOPHILS NFR BLD AUTO: 0.8 % (ref 0–1.5)
BILIRUB SERPL-MCNC: 0.4 MG/DL (ref 0–1.2)
BUN SERPL-MCNC: 19 MG/DL (ref 6–20)
BUN/CREAT SERPL: 15.3 (ref 7–25)
CALCIUM SPEC-SCNC: 9.5 MG/DL (ref 8.6–10.5)
CHLORIDE SERPL-SCNC: 102 MMOL/L (ref 98–107)
CHOLEST SERPL-MCNC: 200 MG/DL (ref 0–200)
CO2 SERPL-SCNC: 28.8 MMOL/L (ref 22–29)
CREAT SERPL-MCNC: 1.24 MG/DL (ref 0.76–1.27)
DEPRECATED RDW RBC AUTO: 43.9 FL (ref 37–54)
EOSINOPHIL # BLD AUTO: 0.07 10*3/MM3 (ref 0–0.4)
EOSINOPHIL NFR BLD AUTO: 1.4 % (ref 0.3–6.2)
ERYTHROCYTE [DISTWIDTH] IN BLOOD BY AUTOMATED COUNT: 13.3 % (ref 12.3–15.4)
GFR SERPL CREATININE-BSD FRML MDRD: 63 ML/MIN/1.73
GFR SERPL CREATININE-BSD FRML MDRD: 76 ML/MIN/1.73
GLOBULIN UR ELPH-MCNC: 3.1 GM/DL
GLUCOSE SERPL-MCNC: 115 MG/DL (ref 65–99)
HBA1C MFR BLD: 6.1 % (ref 4.8–5.6)
HCT VFR BLD AUTO: 48.6 % (ref 37.5–51)
HCV AB SER DONR QL: NORMAL
HDLC SERPL-MCNC: 31 MG/DL (ref 40–60)
HGB BLD-MCNC: 15.8 G/DL (ref 13–17.7)
IMM GRANULOCYTES # BLD AUTO: 0.02 10*3/MM3 (ref 0–0.05)
IMM GRANULOCYTES NFR BLD AUTO: 0.4 % (ref 0–0.5)
LDLC SERPL CALC-MCNC: 134 MG/DL (ref 0–100)
LDLC/HDLC SERPL: 4.22 {RATIO}
LYMPHOCYTES # BLD AUTO: 1.58 10*3/MM3 (ref 0.7–3.1)
LYMPHOCYTES NFR BLD AUTO: 30.6 % (ref 19.6–45.3)
MCH RBC QN AUTO: 29.3 PG (ref 26.6–33)
MCHC RBC AUTO-ENTMCNC: 32.5 G/DL (ref 31.5–35.7)
MCV RBC AUTO: 90.2 FL (ref 79–97)
MONOCYTES # BLD AUTO: 0.41 10*3/MM3 (ref 0.1–0.9)
MONOCYTES NFR BLD AUTO: 7.9 % (ref 5–12)
NEUTROPHILS NFR BLD AUTO: 3.04 10*3/MM3 (ref 1.7–7)
NEUTROPHILS NFR BLD AUTO: 58.9 % (ref 42.7–76)
NRBC BLD AUTO-RTO: 0 /100 WBC (ref 0–0.2)
PLATELET # BLD AUTO: 204 10*3/MM3 (ref 140–450)
PMV BLD AUTO: 9.8 FL (ref 6–12)
POTASSIUM SERPL-SCNC: 4.3 MMOL/L (ref 3.5–5.2)
PROT SERPL-MCNC: 7.6 G/DL (ref 6–8.5)
RBC # BLD AUTO: 5.39 10*6/MM3 (ref 4.14–5.8)
SODIUM SERPL-SCNC: 141 MMOL/L (ref 136–145)
T3FREE SERPL-MCNC: 3.82 PG/ML (ref 2–4.4)
T4 FREE SERPL-MCNC: 0.95 NG/DL (ref 0.93–1.7)
TRIGL SERPL-MCNC: 191 MG/DL (ref 0–150)
TSH SERPL DL<=0.05 MIU/L-ACNC: 1.53 UIU/ML (ref 0.27–4.2)
VIT B12 BLD-MCNC: 688 PG/ML (ref 211–946)
VLDLC SERPL-MCNC: 35 MG/DL (ref 5–40)
WBC # BLD AUTO: 5.16 10*3/MM3 (ref 3.4–10.8)

## 2021-08-27 PROCEDURE — 82306 VITAMIN D 25 HYDROXY: CPT

## 2021-08-27 PROCEDURE — 86803 HEPATITIS C AB TEST: CPT

## 2021-08-27 PROCEDURE — 83036 HEMOGLOBIN GLYCOSYLATED A1C: CPT

## 2021-08-27 PROCEDURE — 84481 FREE ASSAY (FT-3): CPT

## 2021-08-27 PROCEDURE — 85025 COMPLETE CBC W/AUTO DIFF WBC: CPT

## 2021-08-27 PROCEDURE — 82607 VITAMIN B-12: CPT

## 2021-08-27 PROCEDURE — 84443 ASSAY THYROID STIM HORMONE: CPT

## 2021-08-27 PROCEDURE — 80053 COMPREHEN METABOLIC PANEL: CPT

## 2021-08-27 PROCEDURE — 80061 LIPID PANEL: CPT

## 2021-08-27 PROCEDURE — 84439 ASSAY OF FREE THYROXINE: CPT

## 2021-08-30 ENCOUNTER — TELEPHONE (OUTPATIENT)
Dept: FAMILY MEDICINE CLINIC | Facility: CLINIC | Age: 46
End: 2021-08-30

## 2021-08-30 RX ORDER — ERGOCALCIFEROL 1.25 MG/1
50000 CAPSULE ORAL
Qty: 5 CAPSULE | Refills: 3 | Status: SHIPPED | OUTPATIENT
Start: 2021-08-30 | End: 2021-12-16 | Stop reason: SDUPTHER

## 2021-08-30 RX ORDER — METFORMIN HYDROCHLORIDE 500 MG/1
500 TABLET, EXTENDED RELEASE ORAL
Qty: 30 TABLET | Refills: 3 | Status: SHIPPED | OUTPATIENT
Start: 2021-08-30 | End: 2021-12-16 | Stop reason: SDUPTHER

## 2021-08-30 NOTE — TELEPHONE ENCOUNTER
----- Message from Clarence Sadler MD sent at 8/29/2021  9:32 PM CDT -----  Please call pt    Thyroid studies are normal    Hga1c is at 6.10 and pt is prediabetic. Recommend pt watch his sugar and carb intake. Recommend pt start taking metformin  mg daily. Give 30 pills and 3 refills. Will help regulate sugars. May cause GI upset and diarrhea.     On CMP kidney function shows GFR at 76. Recommend more water intake     On lipid panel triglycerides are high along with low HDL and LDL at 134. Recommend heart healthy diet more vegetables, lean protein, exercise weight loss. Recommend OTC fish oil supplement with omega 3 fatty acids and DHA and EPA on bottle     Vitamin D low normal and recommend Vitamin D3 50,000 units once a week give 4 pills and 3 refills     Recheck on next visit thanks

## 2021-09-03 ENCOUNTER — TELEPHONE (OUTPATIENT)
Dept: FAMILY MEDICINE CLINIC | Facility: CLINIC | Age: 46
End: 2021-09-03

## 2021-09-03 NOTE — TELEPHONE ENCOUNTER
[facility] called and said that the referral for the [facility] Colonscopy is covered .. You dont need to do a referral for that.. Any questions call jennifer at the [facility] Number is [phone]

## 2021-09-07 NOTE — PROGRESS NOTES
Chief Complaint  Follow-up (4WK) and Colonoscopy    Subjective          Hank Peacock presents to [facility] - [place]  History of Present Illness  Pt is 45 yo male with management of obesity, major depression, HTN, HLP, ED, migraine headaches, CATA,  Hypogonadism oligospermia, low testosterone, panic attack, PTSD, WILLA on CPAP, prediabetes      8/25/21 pt is here to establish [facility]. He is from [place] and grew in [place]. He served 20 years with [employer] and is currently retired.   He has a CMH of HTn and is on lisionpril 20 mg daily along with norvasc 10 mg dialy. For major depression/CATA pt is on zoloft 100 mg daily along with remeron 15 mg PO qhs and minipress 5 mg PO qhs  For anxiety he takes vistaril PRN. [facility]. A lot of his mental health issues is from serving in .  He served in [place] and [place]. He also suffers from panic attacks and PTSD.    He suffers from migraine headaches and is on imitrex as needed. For ED pt is on cialis 20 mg as needed. Hew as seeing Dr. Alvarado with Endocrinology for oligospermia, hypogonadism and low testosterone. .he takes cialis for ED issues. He is happily  for 6 years.   He has history of 2 divorces in past. He has 1 biological child and another child on the way.  No tobacco use no illicit drug use.  No alcohol use.  He also suffers from migraines and takes imitrex PRN.   He was injured while serving in . He also suffers from back and hip pain. Including pain in shoulders.  He has had no major surgeries.   Family history  Includes mother and father alive.      9/23/21 in office visit for recheck on pt's above medical issues. Pt had labwork done on 8/27/21 that shwoed normal thyroid studies. Vitamin D and b12 levels normal hep C antibody test negative. Lipid panel showed LDL at 134 HDL at 31 and triglcyerides at 191. hga1c was at 6.10 CBC showed normal  hemoglobin. Lipid panel showed triglcyerides at 191 HDL at 31 and LDL at 131. Pt continues to take his medications for his psych issues, depression, migraine headaches, chronic pain, ED, vitamin D deficiency, HTN.  He is fair. Mentally he is doing okay.       Hypertension  This is a chronic problem. The current episode started more than 1 year ago. The problem is unchanged. The problem is uncontrolled. Pertinent negatives include no anxiety, blurred vision, chest pain, headaches or shortness of breath. Risk factors for coronary artery disease include male gender, obesity and dyslipidemia. Past treatments include ACE inhibitors. Current antihypertension treatment includes ACE inhibitors. The current treatment provides no improvement. There is no history of angina, kidney disease, CAD/MI, CVA, heart failure, left ventricular hypertrophy, PVD or retinopathy. There is no history of chronic renal disease, coarctation of the aorta, hyperaldosteronism, hypercortisolism, hyperparathyroidism, a hypertension causing med, pheochromocytoma, renovascular disease, sleep apnea or a thyroid problem.   Depression  Visit Type:followup   Onset of symptoms: at an unknown time  Progression since onset: waxing and waning  Patient presents with the following symptoms: anhedonia, depressed mood and nervousness/anxiety.  Patient is not experiencing: chest pain, choking sensation, compulsions, confusion, decreased concentration, dry mouth, excessive worry, fatigue, feelings of hopelessness, feelings of worthlessness, hypersomnia, hyperventilation, impotence, insomnia, irritability, malaise, memory impairment, muscle tension, nausea, obsessions, shortness of breath and weight loss.  Severity: moderate   Sleep quality: fair  Nighttime awakenings: none  Patient has a history of: anxiety/panic attacks and depression  No history of: anemia, arrhythmia, asthma, bipolar disorder, CHF, chronic lung disease and fibromyalgia  Treatment tried:  "SSRI  Compliance with treatment: variable  Improvement on treatment: moderate   Migraine   This is a chronic problem. The current episode started more than 1 year ago. The problem occurs constantly. The problem has been unchanged. The quality of the pain is described as aching. Pertinent negatives include no blurred vision, coughing, fever, insomnia, nausea, numbness, rhinorrhea, sinus pressure, sore throat, vomiting, weakness or weight loss. The symptoms are aggravated by activity. Treatments tried: imitrex  The treatment provided mild relief. His past medical history is significant for hypertension and obesity. There is no history of cancer, cluster headaches, immunosuppression, migraine headaches, migraines in the family or pseudotumor cerebri. (Oligospermia, hyopgonadism )   Male  Problem  This is a recurrent problem. The problem occurs constantly. The problem has been unchanged. Pertinent negatives include no chest pain, chills, coughing, diarrhea, fever, headaches, nausea, shortness of breath, sore throat or vomiting. There is no history of BPH, chlamydia, cryptorchidism, erectile aid use, erectile dysfunction, a femoral hernia, gonorrhea, herpes simplex, HIV, an inguinal hernia, kidney stones, prostatitis, sickle cell disease, syphilis or varicocele. (Oligospermia, hyopgonadism )   Obesity  This is a chronic problem. The current episode started more than 1 year ago. The problem occurs constantly. The problem has been unchanged. Associated symptoms include arthralgias and fatigue. Pertinent negatives include no chest pain, chills, congestion, coughing, diaphoresis, fever, headaches, nausea, numbness, sore throat, vomiting or weakness. Nothing aggravates the symptoms. The treatment provided no relief.   Objective   Vital Signs:   /72   Pulse 69   Ht 177.8 cm (70\")   Wt 111 kg (245 lb 3.2 oz)   SpO2 98%   BMI 35.18 kg/m²         Physical Exam  Vitals and nursing note reviewed.   Constitutional:      "  Appearance: He is well-developed. He is not diaphoretic.   HENT:      Head: Normocephalic and atraumatic.      Right Ear: External ear normal.   Eyes:      Conjunctiva/sclera: Conjunctivae normal.      Pupils: Pupils are equal, round, and reactive to light.   Cardiovascular:      Rate and Rhythm: Normal rate and regular rhythm.      Heart sounds: Normal heart sounds. No murmur heard.     Pulmonary:      Effort: Pulmonary effort is normal. No respiratory distress.      Breath sounds: Normal breath sounds.   Abdominal:      General: Bowel sounds are normal. There is no distension.      Palpations: Abdomen is soft.      Tenderness: There is no abdominal tenderness.      Comments: Obese abdomen    Musculoskeletal:         General: Tenderness present. No deformity. Normal range of motion.      Cervical back: Normal range of motion and neck supple.   Skin:     General: Skin is warm.      Coloration: Skin is not pale.      Findings: No erythema or rash.   Neurological:      Mental Status: He is alert and oriented to person, place, and time.      Cranial Nerves: No cranial nerve deficit.   Psychiatric:         Behavior: Behavior normal.        Result Review :                 Assessment and Plan    Diagnoses and all orders for this visit:    1. Class 2 severe obesity with serious comorbidity and body mass index (BMI) of 35.0 to 35.9 in adult, unspecified obesity type (CMS/HCC) (Primary)    2. Moderate episode of recurrent major depressive disorder (CMS/HCC)    3. Migraine without status migrainosus, not intractable, unspecified migraine type    4. Essential hypertension    5. Mixed hyperlipidemia    6. Prediabetes            --went over labwork   -annual physical exam today  -recommend tdap vaccination  -recommend COVID-19 vaccination  -recommend colonoscopy screening - [facility]   -prediabetes - prediabetes meal plan information provided   -WILLA on cpap   -prediabetes - prediabetes meal plan. On metformin  mg  PO q daiy.   -multiple joint pain - on ibuprofen PRN, on robaxin at bedtime. Will refer to chiorpractor   -obesity - counseled weight loss >5 mintues BMI at 35.18  -HTN - on lisinopril on norvasc  -HLP - recommend heart healthy diet.    -major depression/PTSD/CATA - on zoloft 100 mg daily on vistaril PRN on remeron 15 mg PO q daily.   -migraine - imitrex PRN   -ED - on cialis PRN   -oligospermia/hyopogonadism/low testosterone - was seeing End[facility]ology at [facility]   -advised pt to be safe and call with questions and concerns  -advised pt to go to ER or call 911 if symptoms worrisome or severe  -advised pt to followup with specialist and referrals  -advised pt to be safe during COVID-19 pandemic  I spent 45 minutes caring for Hank on this date of service. This time includes time spent by me in the following activities: preparing for the visit, reviewing tests, obtaining and/or reviewing a separately obtained history, performing a medically appropriate examination and/or evaluation, counseling and educating the patient/family/caregiver, ordering medications, tests, or procedures, referring and communicating with other health care professionals, documenting information in the medical record, independently interpreting results and communicating that information with the patient/family/caregiver and care coordination.         This document has been electronically signed by Clarence Sadler MD on September 23, 2021 10:31 CDT        Follow Up   Return in about 3 months (around 12/23/2021).  Patient was given instructions and counseling regarding his condition or for health maintenance advice. Please see specific information pulled into the AVS if appropriate.

## 2021-09-15 NOTE — PATIENT INSTRUCTIONS
Heart-Healthy Eating Plan  Many factors influence your heart (coronary) health, including eating and exercise habits. Coronary risk increases with abnormal blood fat (lipid) levels. Heart-healthy meal planning includes limiting unhealthy fats, increasing healthy fats, and making other diet and lifestyle changes.  What is my plan?  Your health care provider may recommend that you:  · Limit your fat intake to _________% or less of your total calories each day.  · Limit your saturated fat intake to _________% or less of your total calories each day.  · Limit the amount of cholesterol in your diet to less than _________ mg per day.  What are tips for following this plan?  Cooking  Cook foods using methods other than frying. Baking, boiling, grilling, and broiling are all good options. Other ways to reduce fat include:  · Removing the skin from poultry.  · Removing all visible fats from meats.  · Steaming vegetables in water or broth.  Meal planning    · At meals, imagine dividing your plate into fourths:  ? Fill one-half of your plate with vegetables and green salads.  ? Fill one-fourth of your plate with whole grains.  ? Fill one-fourth of your plate with lean protein foods.  · Eat 4-5 servings of vegetables per day. One serving equals 1 cup raw or cooked vegetable, or 2 cups raw leafy greens.  · Eat 4-5 servings of fruit per day. One serving equals 1 medium whole fruit, ¼ cup dried fruit, ½ cup fresh, frozen, or canned fruit, or ½ cup 100% fruit juice.  · Eat more foods that contain soluble fiber. Examples include apples, broccoli, carrots, beans, peas, and barley. Aim to get 25-30 g of fiber per day.  · Increase your consumption of legumes, nuts, and seeds to 4-5 servings per week. One serving of dried beans or legumes equals ½ cup cooked, 1 serving of nuts is ¼ cup, and 1 serving of seeds equals 1 tablespoon.    Fats  · Choose healthy fats more often. Choose monounsaturated and polyunsaturated fats, such as olive  and canola oils, flaxseeds, walnuts, almonds, and seeds.  · Eat more omega-3 fats. Choose salmon, mackerel, sardines, tuna, flaxseed oil, and ground flaxseeds. Aim to eat fish at least 2 times each week.  · Check food labels carefully to identify foods with trans fats or high amounts of saturated fat.  · Limit saturated fats. These are found in animal products, such as meats, butter, and cream. Plant sources of saturated fats include palm oil, palm kernel oil, and coconut oil.  · Avoid foods with partially hydrogenated oils in them. These contain trans fats. Examples are stick margarine, some tub margarines, cookies, crackers, and other baked goods.  · Avoid fried foods.  General information  · Eat more home-cooked food and less restaurant, buffet, and fast food.  · Limit or avoid alcohol.  · Limit foods that are high in starch and sugar.  · Lose weight if you are overweight. Losing just 5-10% of your body weight can help your overall health and prevent diseases such as diabetes and heart disease.  · Monitor your salt (sodium) intake, especially if you have high blood pressure. Talk with your health care provider about your sodium intake.  · Try to incorporate more vegetarian meals weekly.  What foods can I eat?  Fruits  All fresh, canned (in natural juice), or frozen fruits.  Vegetables  Fresh or frozen vegetables (raw, steamed, roasted, or grilled). Green salads.  Grains  Most grains. Choose whole wheat and whole grains most of the time. Rice and pasta, including brown rice and pastas made with whole wheat.  Meats and other proteins  Lean, well-trimmed beef, veal, pork, and lamb. Chicken and turkey without skin. All fish and shellfish. Wild duck, rabbit, pheasant, and venison. Egg whites or low-cholesterol egg substitutes. Dried beans, peas, lentils, and tofu. Seeds and most nuts.  Dairy  Low-fat or nonfat cheeses, including ricotta and mozzarella. Skim or 1% milk (liquid, powdered, or evaporated). Buttermilk made  with low-fat milk. Nonfat or low-fat yogurt.  Fats and oils  Non-hydrogenated (trans-free) margarines. Vegetable oils, including soybean, sesame, sunflower, olive, peanut, safflower, corn, canola, and cottonseed. Salad dressings or mayonnaise made with a vegetable oil.  Beverages  Water (mineral or sparkling). Coffee and tea. Diet carbonated beverages.  Sweets and desserts  Sherbet, gelatin, and fruit ice. Small amounts of dark chocolate.  Limit all sweets and desserts.  Seasonings and condiments  All seasonings and condiments.  The items listed above may not be a complete list of foods and beverages you can eat. Contact a dietitian for more options.  What foods are not recommended?  Fruits  Canned fruit in heavy syrup. Fruit in cream or butter sauce. Fried fruit. Limit coconut.  Vegetables  Vegetables cooked in cheese, cream, or butter sauce. Fried vegetables.  Grains  Breads made with saturated or trans fats, oils, or whole milk. Croissants. Sweet rolls. Donuts. High-fat crackers, such as cheese crackers.  Meats and other proteins  Fatty meats, such as hot dogs, ribs, sausage, au, rib-eye roast or steak. High-fat deli meats, such as salami and bologna. Caviar. Domestic duck and goose. Organ meats, such as liver.  Dairy  Cream, sour cream, cream cheese, and creamed cottage cheese. Whole milk cheeses. Whole or 2% milk (liquid, evaporated, or condensed). Whole buttermilk. Cream sauce or high-fat cheese sauce. Whole-milk yogurt.  Fats and oils  Meat fat, or shortening. Cocoa butter, hydrogenated oils, palm oil, coconut oil, palm kernel oil. Solid fats and shortenings, including au fat, salt pork, lard, and butter. Nondairy cream substitutes. Salad dressings with cheese or sour cream.  Beverages  Regular sodas and any drinks with added sugar.  Sweets and desserts  Frosting. Pudding. Cookies. Cakes. Pies. Milk chocolate or white chocolate. Buttered syrups. Full-fat ice cream or ice cream drinks.  The items listed  "above may not be a complete list of foods and beverages to avoid. Contact a dietitian for more information.  Summary  · Heart-healthy meal planning includes limiting unhealthy fats, increasing healthy fats, and making other diet and lifestyle changes.  · Lose weight if you are overweight. Losing just 5-10% of your body weight can help your overall health and prevent diseases such as diabetes and heart disease.  · Focus on eating a balance of foods, including fruits and vegetables, low-fat or nonfat dairy, lean protein, nuts and legumes, whole grains, and heart-healthy oils and fats.  This information is not intended to replace advice given to you by your health care provider. Make sure you discuss any questions you have with your health care provider.  Document Revised: 01/25/2019 Document Reviewed: 01/25/2019  [employer] Patient Education © 2021 [employer]    Diabetes Care, 44(Suppl 1), S34-S39. [email or web address]">   Prediabetes  Prediabetes is when your blood sugar (blood glucose) level is higher than normal but not high enough for you to be diagnosed with type 2 diabetes. Having prediabetes puts you at risk for developing type 2 diabetes (type 2 diabetes mellitus).  With certain lifestyle changes, you may be able to prevent or delay the onset of type 2 diabetes. This is important because type 2 diabetes can lead to serious complications, such as:  · Heart disease.  · Stroke.  · Blindness.  · Kidney disease.  · Depression.  · Poor circulation in the feet and legs. In severe cases, this could lead to surgical removal of a leg (amputation).  What are the causes?  The exact cause of prediabetes is not known. It may result from insulin resistance. Insulin resistance develops when cells in the body do not respond properly to insulin that the body makes. This can cause excess glucose to build up in the blood. High blood glucose (hyperglycemia) can develop.  What increases the risk?  The " following factors may make you more likely to develop this condition:  · You have a family member with type 2 diabetes.  · You are older than 45 years.  · You had a temporary form of diabetes during a pregnancy (gestational diabetes).  · You had polycystic ovary syndrome (PCOS).  · You are overweight or obese.  · You are inactive (sedentary).  · You have a history of heart disease, including problems with cholesterol levels, high levels of blood fats, or high blood pressure.  What are the signs or symptoms?  You may have no symptoms. If you do have symptoms, they may include:  · Increased hunger.  · Increased thirst.  · Increased urination.  · Vision changes, such as blurry vision.  · Tiredness (fatigue).  How is this diagnosed?  This condition can be diagnosed with blood tests. Your blood glucose may be checked with one or more of the following tests:  · A fasting blood glucose (FBG) test. You will not be allowed to eat (you will fast) for at least 8 hours before a blood sample is taken.  · An A1C blood test (hemoglobin A1C). This test provides information about blood glucose levels over the previous 2?3 months.  · An oral glucose tolerance test (OGTT). This test measures your blood glucose at two points in time:  ? After fasting. This is your baseline level.  ? Two hours after you drink a beverage that contains glucose.  You may be diagnosed with prediabetes if:  · Your FBG is 100?125 mg/dL (5.6-6.9 mmol/L).  · Your A1C level is 5.7?6.4% (39-46 mmol/mol).  · Your OGTT result is 140?199 mg/dL (7.8-11 mmol/L).  These blood tests may be repeated to confirm your diagnosis.  How is this treated?  Treatment may include dietary and lifestyle changes to help lower your blood glucose and prevent type 2 diabetes from developing. In some cases, medicine may be prescribed to help lower the risk of type 2 diabetes.  Follow these instructions at home:  Nutrition    · Follow a healthy meal plan. This includes eating lean  proteins, whole grains, legumes, fresh fruits and vegetables, low-fat dairy products, and healthy fats.  · Follow instructions from your health care provider about eating or drinking restrictions.  · Meet with a dietitian to create a healthy eating plan that is right for you.    Lifestyle  · Do moderate-intensity exercise for at least 30 minutes a day on 5 or more days each week, or as told by your health care provider. A mix of activities may be best, such as:  ? Brisk walking, swimming, biking, and weight lifting.  · Lose weight as told by your health care provider. Losing 5-7% of your body weight can reverse insulin resistance.  · Do not drink alcohol if:  ? Your health care provider tells you not to drink.  ? You are pregnant, may be pregnant, or are planning to become pregnant.  · If you drink alcohol:  ? Limit how much you use to:  § 0-1 drink a day for women.  § 0-2 drinks a day for men.  ? Be aware of how much alcohol is in your drink. In the [place] one drink equals one 12 oz bottle of beer (355 mL), one 5 oz glass of wine (148 mL), or one 1½ oz glass of hard liquor (44 mL).  General instructions  · Take over-the-counter and prescription medicines only as told by your health care provider. You may be prescribed medicines that help lower the risk of type 2 diabetes.  · Do not use any products that contain nicotine or tobacco, such as cigarettes, e-cigarettes, and chewing tobacco. If you need help quitting, ask your health care provider.  · Keep all follow-up visits. This is important.  Where to find more information  · American Diabetes Association: www.diabetes.org  · Academy of Nutrition and Dietetics: www.eatright.org  · American Heart Association: www.heart.org  Contact a health care provider if:  · You have any of these symptoms:  ? Increased hunger.  ? Increased urination.  ? Increased thirst.  ? Fatigue.  ? Vision changes, such as blurry vision.  Get help right away if you:  · Have shortness of  breath.  · Feel confused.  · Vomit or feel like you may vomit.  Summary  · Prediabetes is when your blood sugar (blood glucose)level is higher than normal but not high enough for you to be diagnosed with type 2 diabetes.  · Having prediabetes puts you at risk for developing type 2 diabetes (type 2 diabetes mellitus).  · Make lifestyle changes such as eating a healthy diet and exercising regularly to help prevent diabetes. Lose weight as told by your health care provider.  This information is not intended to replace advice given to you by your health care provider. Make sure you discuss any questions you have with your health care provider.  Document Revised: 03/18/2021 Document Reviewed: 03/18/2021  [employer] Patient Education © 2021 [employer]    Heart-Healthy Eating Plan  Many factors influence your heart (coronary) health, including eating and exercise habits. Coronary risk increases with abnormal blood fat (lipid) levels. Heart-healthy meal planning includes limiting unhealthy fats, increasing healthy fats, and making other diet and lifestyle changes.  What is my plan?  Your health care provider may recommend that you:  · Limit your fat intake to _________% or less of your total calories each day.  · Limit your saturated fat intake to _________% or less of your total calories each day.  · Limit the amount of cholesterol in your diet to less than _________ mg per day.  What are tips for following this plan?  Cooking  Cook foods using methods other than frying. Baking, boiling, grilling, and broiling are all good options. Other ways to reduce fat include:  · Removing the skin from poultry.  · Removing all visible fats from meats.  · Steaming vegetables in water or broth.  Meal planning    · At meals, imagine dividing your plate into fourths:  ? Fill one-half of your plate with vegetables and green salads.  ? Fill one-fourth of your plate with whole grains.  ? Fill one-fourth of your plate with lean protein  foods.  · Eat 4-5 servings of vegetables per day. One serving equals 1 cup raw or cooked vegetable, or 2 cups raw leafy greens.  · Eat 4-5 servings of fruit per day. One serving equals 1 medium whole fruit, ¼ cup dried fruit, ½ cup fresh, frozen, or canned fruit, or ½ cup 100% fruit juice.  · Eat more foods that contain soluble fiber. Examples include apples, broccoli, carrots, beans, peas, and barley. Aim to get 25-30 g of fiber per day.  · Increase your consumption of legumes, nuts, and seeds to 4-5 servings per week. One serving of dried beans or legumes equals ½ cup cooked, 1 serving of nuts is ¼ cup, and 1 serving of seeds equals 1 tablespoon.    Fats  · Choose healthy fats more often. Choose monounsaturated and polyunsaturated fats, such as olive and canola oils, flaxseeds, walnuts, almonds, and seeds.  · Eat more omega-3 fats. Choose salmon, mackerel, sardines, tuna, flaxseed oil, and ground flaxseeds. Aim to eat fish at least 2 times each week.  · Check food labels carefully to identify foods with trans fats or high amounts of saturated fat.  · Limit saturated fats. These are found in animal products, such as meats, butter, and cream. Plant sources of saturated fats include palm oil, palm kernel oil, and coconut oil.  · Avoid foods with partially hydrogenated oils in them. These contain trans fats. Examples are stick margarine, some tub margarines, cookies, crackers, and other baked goods.  · Avoid fried foods.  General information  · Eat more home-cooked food and less restaurant, buffet, and fast food.  · Limit or avoid alcohol.  · Limit foods that are high in starch and sugar.  · Lose weight if you are overweight. Losing just 5-10% of your body weight can help your overall health and prevent diseases such as diabetes and heart disease.  · Monitor your salt (sodium) intake, especially if you have high blood pressure. Talk with your health care provider about your sodium intake.  · Try to incorporate more  vegetarian meals weekly.  What foods can I eat?  Fruits  All fresh, canned (in natural juice), or frozen fruits.  Vegetables  Fresh or frozen vegetables (raw, steamed, roasted, or grilled). Green salads.  Grains  Most grains. Choose whole wheat and whole grains most of the time. Rice and pasta, including brown rice and pastas made with whole wheat.  Meats and other proteins  Lean, well-trimmed beef, veal, pork, and lamb. Chicken and turkey without skin. All fish and shellfish. Wild duck, rabbit, pheasant, and venison. Egg whites or low-cholesterol egg substitutes. Dried beans, peas, lentils, and tofu. Seeds and most nuts.  Dairy  Low-fat or nonfat cheeses, including ricotta and mozzarella. Skim or 1% milk (liquid, powdered, or evaporated). Buttermilk made with low-fat milk. Nonfat or low-fat yogurt.  Fats and oils  Non-hydrogenated (trans-free) margarines. Vegetable oils, including soybean, sesame, sunflower, olive, peanut, safflower, corn, canola, and cottonseed. Salad dressings or mayonnaise made with a vegetable oil.  Beverages  Water (mineral or sparkling). Coffee and tea. Diet carbonated beverages.  Sweets and desserts  Sherbet, gelatin, and fruit ice. Small amounts of dark chocolate.  Limit all sweets and desserts.  Seasonings and condiments  All seasonings and condiments.  The items listed above may not be a complete list of foods and beverages you can eat. Contact a dietitian for more options.  What foods are not recommended?  Fruits  Canned fruit in heavy syrup. Fruit in cream or butter sauce. Fried fruit. Limit coconut.  Vegetables  Vegetables cooked in cheese, cream, or butter sauce. Fried vegetables.  Grains  Breads made with saturated or trans fats, oils, or whole milk. Croissants. Sweet rolls. Donuts. High-fat crackers, such as cheese crackers.  Meats and other proteins  Fatty meats, such as hot dogs, ribs, sausage, au, rib-eye roast or steak. High-fat deli meats, such as salami and bologna. Caviar.  Domestic duck and goose. Organ meats, such as liver.  Dairy  Cream, sour cream, cream cheese, and creamed cottage cheese. Whole milk cheeses. Whole or 2% milk (liquid, evaporated, or condensed). Whole buttermilk. Cream sauce or high-fat cheese sauce. Whole-milk yogurt.  Fats and oils  Meat fat, or shortening. Cocoa butter, hydrogenated oils, palm oil, coconut oil, palm kernel oil. Solid fats and shortenings, including au fat, salt pork, lard, and butter. Nondairy cream substitutes. Salad dressings with cheese or sour cream.  Beverages  Regular sodas and any drinks with added sugar.  Sweets and desserts  Frosting. Pudding. Cookies. Cakes. Pies. Milk chocolate or white chocolate. Buttered syrups. Full-fat ice cream or ice cream drinks.  The items listed above may not be a complete list of foods and beverages to avoid. Contact a dietitian for more information.  Summary  · Heart-healthy meal planning includes limiting unhealthy fats, increasing healthy fats, and making other diet and lifestyle changes.  · Lose weight if you are overweight. Losing just 5-10% of your body weight can help your overall health and prevent diseases such as diabetes and heart disease.  · Focus on eating a balance of foods, including fruits and vegetables, low-fat or nonfat dairy, lean protein, nuts and legumes, whole grains, and heart-healthy oils and fats.  This information is not intended to replace advice given to you by your health care provider. Make sure you discuss any questions you have with your health care provider.  Document Revised: 01/25/2019 Document Reviewed: 01/25/2019  [email or web address] Patient Education © 2021 [email or web address]    Prediabetes Eating Plan  Prediabetes is a condition that causes blood sugar (glucose) levels to be higher than normal. This increases the risk for developing type 2 diabetes (type 2 diabetes mellitus). Working with a health care provider or nutrition specialist (dietitian) to make diet and lifestyle changes  can help prevent the onset of diabetes. These changes may help you:  · Control your blood glucose levels.  · Improve your cholesterol levels.  · Manage your blood pressure.  What are tips for following this plan?  Reading food labels  · Read food labels to check the amount of fat, salt (sodium), and sugar in prepackaged foods. Avoid foods that have:  ? Saturated fats.  ? Trans fats.  ? Added sugars.  · Avoid foods that have more than 300 milligrams (mg) of sodium per serving. Limit your sodium intake to less than 2,300 mg each day.  Shopping  · Avoid buying pre-made and processed foods.  · Avoid buying drinks with added sugar.  Cooking  · Cook with olive oil. Do not use butter, lard, or ghee.  · Bake, broil, grill, steam, or boil foods. Avoid frying.  Meal planning    · Work with your dietitian to create an eating plan that is right for you. This may include tracking how many calories you take in each day. Use a food diary, notebook, or mobile application to track what you eat at each meal.  · Consider following a Mediterranean diet. This includes:  ? Eating several servings of fresh fruits and vegetables each day.  ? Eating fish at least twice a week.  ? Eating one serving each day of whole grains, beans, nuts, and seeds.  ? Using olive oil instead of other fats.  ? Limiting alcohol.  ? Limiting red meat.  ? Using nonfat or low-fat dairy products.  · Consider following a plant-based diet. This includes dietary choices that focus on eating mostly vegetables and fruit, grains, beans, nuts, and seeds.  · If you have high blood pressure, you may need to limit your sodium intake or follow a diet such as the DASH (Dietary Approaches to Stop Hypertension) eating plan. The DASH diet aims to lower high blood pressure.    Lifestyle  · Set weight loss goals with help from your health care team. It is recommended that most people with prediabetes lose 7% of their body weight.  · Exercise for at least 30 minutes 5 or more days  a week.  · Attend a support group or seek support from a mental health counselor.  · Take over-the-counter and prescription medicines only as told by your health care provider.  What foods are recommended?  Fruits  Berries. Bananas. Apples. Oranges. Grapes. Papaya. [place]. Pomegranate. Kiwi. Grapefruit. Cherries.  Vegetables  Lettuce. Spinach. Peas. Beets. Cauliflower. Cabbage. Broccoli. Carrots. Tomatoes. Squash. Eggplant. Herbs. Peppers. Onions. Cucumbers. [place] sprouts.  Grains  Whole grains, such as whole-wheat or whole-grain breads, crackers, cereals, and pasta. Unsweetened oatmeal. Bulgur. Barley. Quinoa. Brown rice. Corn or whole-wheat flour tortillas or taco shells.  Meats and other proteins  Seafood. Poultry without skin. Lean cuts of pork and beef. Tofu. Eggs. Nuts. Beans.  Dairy  Low-fat or fat-free dairy products, such as yogurt, cottage cheese, and cheese.  Beverages  Water. Tea. Coffee. Sugar-free or diet soda. [place] water. Low-fat or nonfat milk. Milk alternatives, such as soy or almond milk.  Fats and oils  Olive oil. Canola oil. Sunflower oil. Grapeseed oil. Avocado. Walnuts.  Sweets and desserts  Sugar-free or low-fat pudding. Sugar-free or low-fat ice cream and other frozen treats.  Seasonings and condiments  Herbs. Sodium-free spices. Mustard. Relish. Low-salt, low-sugar ketchup. Low-salt, low-sugar barbecue sauce. Low-fat or fat-free mayonnaise.  The items listed above may not be a complete list of recommended foods and beverages. Contact a dietitian for more information.  What foods are not recommended?  Fruits  Fruits canned with syrup.  Vegetables  Canned vegetables. Frozen vegetables with butter or cream sauce.  Grains  Refined white flour and flour products, such as bread, pasta, snack foods, and cereals.  Meats and other proteins  Fatty cuts of meat. Poultry with skin. Breaded or fried meat. Processed meats.  Dairy  Full-fat yogurt, cheese, or milk.  Beverages  Sweetened drinks, such  as iced tea and soda.  Fats and oils  Butter. Lard. Ghee.  Sweets and desserts  Baked goods, such as cake, cupcakes, pastries, cookies, and cheesecake.  Seasonings and condiments  Spice mixes with added salt. Ketchup. Barbecue sauce. Mayonnaise.  The items listed above may not be a complete list of foods and beverages that are not recommended. Contact a dietitian for more information.  Where to find more information  · American Diabetes Association: www.diabetes.org  Summary  · You may need to make diet and lifestyle changes to help prevent the onset of diabetes. These changes can help you control blood sugar, improve cholesterol levels, and manage blood pressure.  · Set weight loss goals with help from your health care team. It is recommended that most people with prediabetes lose 7% of their body weight.  · Consider following a Mediterranean diet. This includes eating plenty of fresh fruits and vegetables, whole grains, beans, nuts, seeds, fish, and low-fat dairy, and using olive oil instead of other fats.  This information is not intended to replace advice given to you by your health care provider. Make sure you discuss any questions you have with your health care provider.  Document Revised: 03/18/2021 Document Reviewed: 03/18/2021  [employer] Patient Education © 2021 [employer]  Metformin extended-release tablets  What is this medicine?  METFORMIN (met FOR min) is used to treat type 2 diabetes. It helps to control blood sugar. Treatment is combined with diet and exercise. This medicine can be used alone or with other medicines for diabetes.  This medicine may be used for other purposes; ask your health care provider or pharmacist if you have questions.  COMMON BRAND NAME(S): Fortamet, Glucophage XR, Glumetza  What should I tell my health care provider before I take this medicine?  They need to know if you have any of these conditions:  · anemia  · dehydration  · heart disease  · frequently drink  alcohol-containing beverages  · kidney disease  · liver disease  · polycystic ovary syndrome  · serious infection or injury  · vomiting  · an unusual or allergic reaction to metformin, other medicines, foods, dyes, or preservatives  · pregnant or trying to get pregnant  · breast-feeding  How should I use this medicine?  Take this medicine by mouth with a glass of water. Follow the directions on the prescription label. Take this medicine with food. Take your medicine at regular intervals. Do not take your medicine more often than directed. Do not stop taking except on your doctor's advice.  Talk to your pediatrician regarding the use of this medicine in children. Special care may be needed.  Overdosage: If you think you have taken too much of this medicine contact a poison control center or emergency room at once.  NOTE: This medicine is only for you. Do not share this medicine with others.  What if I miss a dose?  If you miss a dose, take it as soon as you can. If it is almost time for your next dose, take only that dose. Do not take double or extra doses.  What may interact with this medicine?  Do not take this medicine with any of the following medications:  · certain contrast medicines given before X-rays, CT scans, MRI, or other procedures  · dofetilide  This medicine may also interact with the following medications:  · acetazolamide  · alcohol  · certain antivirals for HIV or hepatitis  · certain medicines for blood pressure, heart disease, irregular heart beat  · cimetidine  · dichlorphenamide  · digoxin  · diuretics  · female hormones, like estrogens or progestins and birth control pills  · glycopyrrolate  · isoniazid  · lamotrigine  · memantine  · methazolamide  · metoclopramide  · midodrine  · niacin  · phenothiazines like chlorpromazine, mesoridazine, prochlorperazine, thioridazine  · phenytoin  · ranolazine  · steroid medicines like prednisone or cortisone  · stimulant medicines for attention disorders,  weight loss, or to stay awake  · thyroid medicines  · topiramate  · trospium  · vandetanib  · zonisamide  This list may not describe all possible interactions. Give your health care provider a list of all the medicines, herbs, non-prescription drugs, or dietary supplements you use. Also tell them if you smoke, drink alcohol, or use illegal drugs. Some items may interact with your medicine.  What should I watch for while using this medicine?  Visit your doctor or health care professional for regular checks on your progress.  A test called the HbA1C (A1C) will be monitored. This is a simple blood test. It measures your blood sugar control over the last 2 to 3 months. You will receive this test every 3 to 6 months.  Learn how to check your blood sugar. Learn the symptoms of low and high blood sugar and how to manage them.  Always carry a quick-source of sugar with you in case you have symptoms of low blood sugar. Examples include hard sugar candy or glucose tablets. Make sure others know that you can choke if you eat or drink when you develop serious symptoms of low blood sugar, such as seizures or unconsciousness. They must get medical help at once.  Tell your doctor or health care professional if you have high blood sugar. You might need to change the dose of your medicine. If you are sick or exercising more than usual, you might need to change the dose of your medicine.  Do not skip meals. Ask your doctor or health care professional if you should avoid alcohol. Many nonprescription cough and cold products contain sugar or alcohol. These can affect blood sugar.  This medicine may cause ovulation in premenopausal women who do not have regular monthly periods. This may increase your chances of becoming pregnant. You should not take this medicine if you become pregnant or think you may be pregnant. Talk with your doctor or health care professional about your birth control options while taking this medicine. Contact your  doctor or health care professional right away if you think you are pregnant.  The tablet shell for some brands of this medicine does not dissolve. This is normal. The tablet shell may appear whole in the stool. This is not a cause for concern.  If you are going to need surgery, a MRI, CT scan, or other procedure, tell your doctor that you are taking this medicine. You may need to stop taking this medicine before the procedure.  Wear a medical ID bracelet or chain, and carry a card that describes your disease and details of your medicine and dosage times.  This medicine may cause a decrease in folic acid and vitamin B12. You should make sure that you get enough vitamins while you are taking this medicine. Discuss the foods you eat and the vitamins you take with your health care professional.  What side effects may I notice from receiving this medicine?  Side effects that you should report to your doctor or health care professional as soon as possible:  · allergic reactions like skin rash, itching or hives, swelling of the face, lips, or tongue  · breathing problems  · feeling faint or lightheaded, falls  · muscle aches or pains  · signs and symptoms of low blood sugar such as feeling anxious, confusion, dizziness, increased hunger, unusually weak or tired, sweating, shakiness, cold, irritable, headache, blurred vision, fast heartbeat, loss of consciousness  · slow or irregular heartbeat  · unusual stomach pain or discomfort  · unusually tired or weak  Side effects that usually do not require medical attention (report to your doctor or health care professional if they continue or are bothersome):  · diarrhea  · headache  · heartburn  · metallic taste in mouth  · nausea  · stomach gas, upset  This list may not describe all possible side effects. Call your doctor for medical advice about side effects. You may report side effects to FDA at 1-717-FDA-7427.  Where should I keep my medicine?  Keep out of the reach of  children.  Store at room temperature between 15 and 30 degrees C (59 and 86 degrees F). Protect from light. Throw away any unused medicine after the expiration date.  NOTE: This sheet is a summary. It may not cover all possible information. If you have questions about this medicine, talk to your doctor, pharmacist, or health care provider.  © 2021 [employer] (2019-01-24 18:58:32)  Metformin tablets  What is this medicine?  METFORMIN (met FOR min) is used to treat type 2 diabetes. It helps to control blood sugar. Treatment is combined with diet and exercise. This medicine can be used alone or with other medicines for diabetes.  This medicine may be used for other purposes; ask your health care provider or pharmacist if you have questions.  COMMON BRAND NAME(S): Glucophage  What should I tell my health care provider before I take this medicine?  They need to know if you have any of these conditions:  · anemia  · dehydration  · heart disease  · frequently drink alcohol-containing beverages  · kidney disease  · liver disease  · polycystic ovary syndrome  · serious infection or injury  · vomiting  · an unusual or allergic reaction to metformin, other medicines, foods, dyes, or preservatives  · pregnant or trying to get pregnant  · breast-feeding  How should I use this medicine?  Take this medicine by mouth with a glass of water. Follow the directions on the prescription label. Take this medicine with food. Take your medicine at regular intervals. Do not take your medicine more often than directed. Do not stop taking except on your doctor's advice.  Talk to your pediatrician regarding the use of this medicine in children. While this drug may be prescribed for children as young as 10 years of age for selected conditions, precautions do apply.  Overdosage: If you think you have taken too much of this medicine contact a poison control center or emergency room at once.  NOTE: This medicine is only for you. Do not  share this medicine with others.  What if I miss a dose?  If you miss a dose, take it as soon as you can. If it is almost time for your next dose, take only that dose. Do not take double or extra doses.  What may interact with this medicine?  Do not take this medicine with any of the following medications:  · certain contrast medicines given before X-rays, CT scans, MRI, or other procedures  · dofetilide  This medicine may also interact with the following medications:  · acetazolamide  · alcohol  · certain antivirals for HIV or hepatitis  · certain medicines for blood pressure, heart disease, irregular heart beat  · cimetidine  · dichlorphenamide  · digoxin  · diuretics  · female hormones, like estrogens or progestins and birth control pills  · glycopyrrolate  · isoniazid  · lamotrigine  · memantine  · methazolamide  · metoclopramide  · midodrine  · niacin  · phenothiazines like chlorpromazine, mesoridazine, prochlorperazine, thioridazine  · phenytoin  · ranolazine  · steroid medicines like prednisone or cortisone  · stimulant medicines for attention disorders, weight loss, or to stay awake  · thyroid medicines  · topiramate  · trospium  · vandetanib  · zonisamide  This list may not describe all possible interactions. Give your health care provider a list of all the medicines, herbs, non-prescription drugs, or dietary supplements you use. Also tell them if you smoke, drink alcohol, or use illegal drugs. Some items may interact with your medicine.  What should I watch for while using this medicine?  Visit your doctor or health care professional for regular checks on your progress.  A test called the HbA1C (A1C) will be monitored. This is a simple blood test. It measures your blood sugar control over the last 2 to 3 months. You will receive this test every 3 to 6 months.  Learn how to check your blood sugar. Learn the symptoms of low and high blood sugar and how to manage them.  Always carry a quick-source of sugar  with you in case you have symptoms of low blood sugar. Examples include hard sugar candy or glucose tablets. Make sure others know that you can choke if you eat or drink when you develop serious symptoms of low blood sugar, such as seizures or unconsciousness. They must get medical help at once.  Tell your doctor or health care professional if you have high blood sugar. You might need to change the dose of your medicine. If you are sick or exercising more than usual, you might need to change the dose of your medicine.  Do not skip meals. Ask your doctor or health care professional if you should avoid alcohol. Many nonprescription cough and cold products contain sugar or alcohol. These can affect blood sugar.  This medicine may cause ovulation in premenopausal women who do not have regular monthly periods. This may increase your chances of becoming pregnant. You should not take this medicine if you become pregnant or think you may be pregnant. Talk with your doctor or health care professional about your birth control options while taking this medicine. Contact your doctor or health care professional right away if you think you are pregnant.  If you are going to need surgery, a MRI, CT scan, or other procedure, tell your doctor that you are taking this medicine. You may need to stop taking this medicine before the procedure.  Wear a medical ID bracelet or chain, and carry a card that describes your disease and details of your medicine and dosage times.  This medicine may cause a decrease in folic acid and vitamin B12. You should make sure that you get enough vitamins while you are taking this medicine. Discuss the foods you eat and the vitamins you take with your health care professional.  What side effects may I notice from receiving this medicine?  Side effects that you should report to your doctor or health care professional as soon as possible:  · allergic reactions like skin rash, itching or hives, swelling of  the face, lips, or tongue  · breathing problems  · feeling faint or lightheaded, falls  · muscle aches or pains  · signs and symptoms of low blood sugar such as feeling anxious, confusion, dizziness, increased hunger, unusually weak or tired, sweating, shakiness, cold, irritable, headache, blurred vision, fast heartbeat, loss of consciousness  · slow or irregular heartbeat  · unusual stomach pain or discomfort  · unusually tired or weak  Side effects that usually do not require medical attention (report to your doctor or health care professional if they continue or are bothersome):  · diarrhea  · headache  · heartburn  · metallic taste in mouth  · nausea  · stomach gas, upset  This list may not describe all possible side effects. Call your doctor for medical advice about side effects. You may report side effects to FDA at 3-770-FDA-1508.  Where should I keep my medicine?  Keep out of the reach of children.  Store at room temperature between 15 and 30 degrees C (59 and 86 degrees F). Protect from moisture and light. Throw away any unused medicine after the expiration date.  NOTE: This sheet is a summary. It may not cover all possible information. If you have questions about this medicine, talk to your doctor, pharmacist, or health care provider.  © 2021 [employer] (2019-01-24 19:15:19)

## 2021-09-23 ENCOUNTER — OFFICE VISIT (OUTPATIENT)
Dept: FAMILY MEDICINE CLINIC | Facility: CLINIC | Age: 46
End: 2021-09-23

## 2021-09-23 VITALS
BODY MASS INDEX: 35.1 KG/M2 | WEIGHT: 245.2 LBS | HEART RATE: 69 BPM | HEIGHT: 70 IN | OXYGEN SATURATION: 98 % | SYSTOLIC BLOOD PRESSURE: 126 MMHG | DIASTOLIC BLOOD PRESSURE: 72 MMHG

## 2021-09-23 DIAGNOSIS — E78.2 MIXED HYPERLIPIDEMIA: ICD-10-CM

## 2021-09-23 DIAGNOSIS — G43.909 MIGRAINE WITHOUT STATUS MIGRAINOSUS, NOT INTRACTABLE, UNSPECIFIED MIGRAINE TYPE: ICD-10-CM

## 2021-09-23 DIAGNOSIS — E66.01 CLASS 2 SEVERE OBESITY WITH SERIOUS COMORBIDITY AND BODY MASS INDEX (BMI) OF 35.0 TO 35.9 IN ADULT, UNSPECIFIED OBESITY TYPE (HCC): Primary | ICD-10-CM

## 2021-09-23 DIAGNOSIS — Z23 NEED FOR IMMUNIZATION AGAINST INFLUENZA: ICD-10-CM

## 2021-09-23 DIAGNOSIS — F33.1 MODERATE EPISODE OF RECURRENT MAJOR DEPRESSIVE DISORDER (HCC): ICD-10-CM

## 2021-09-23 DIAGNOSIS — R73.03 PREDIABETES: ICD-10-CM

## 2021-09-23 DIAGNOSIS — I10 ESSENTIAL HYPERTENSION: ICD-10-CM

## 2021-09-23 PROCEDURE — 99214 OFFICE O/P EST MOD 30 MIN: CPT | Performed by: FAMILY MEDICINE

## 2021-09-23 PROCEDURE — 90686 IIV4 VACC NO PRSV 0.5 ML IM: CPT | Performed by: FAMILY MEDICINE

## 2021-09-23 PROCEDURE — 90471 IMMUNIZATION ADMIN: CPT | Performed by: FAMILY MEDICINE

## 2021-12-16 ENCOUNTER — OFFICE VISIT (OUTPATIENT)
Dept: FAMILY MEDICINE CLINIC | Facility: CLINIC | Age: 46
End: 2021-12-16

## 2021-12-16 ENCOUNTER — TELEPHONE (OUTPATIENT)
Dept: FAMILY MEDICINE CLINIC | Facility: CLINIC | Age: 46
End: 2021-12-16

## 2021-12-16 VITALS
SYSTOLIC BLOOD PRESSURE: 126 MMHG | DIASTOLIC BLOOD PRESSURE: 78 MMHG | WEIGHT: 245.8 LBS | OXYGEN SATURATION: 96 % | TEMPERATURE: 97.5 F | BODY MASS INDEX: 35.19 KG/M2 | HEART RATE: 87 BPM | HEIGHT: 70 IN

## 2021-12-16 DIAGNOSIS — E66.01 CLASS 2 SEVERE OBESITY WITH SERIOUS COMORBIDITY AND BODY MASS INDEX (BMI) OF 35.0 TO 35.9 IN ADULT, UNSPECIFIED OBESITY TYPE (HCC): Primary | ICD-10-CM

## 2021-12-16 DIAGNOSIS — F43.10 PTSD (POST-TRAUMATIC STRESS DISORDER): ICD-10-CM

## 2021-12-16 DIAGNOSIS — Z12.11 ENCOUNTER FOR SCREENING COLONOSCOPY: ICD-10-CM

## 2021-12-16 DIAGNOSIS — N52.9 ERECTILE DYSFUNCTION, UNSPECIFIED ERECTILE DYSFUNCTION TYPE: ICD-10-CM

## 2021-12-16 DIAGNOSIS — E78.2 MIXED HYPERLIPIDEMIA: ICD-10-CM

## 2021-12-16 DIAGNOSIS — F33.1 MODERATE EPISODE OF RECURRENT MAJOR DEPRESSIVE DISORDER (HCC): ICD-10-CM

## 2021-12-16 DIAGNOSIS — I10 ESSENTIAL HYPERTENSION: ICD-10-CM

## 2021-12-16 DIAGNOSIS — R73.03 PREDIABETES: ICD-10-CM

## 2021-12-16 PROCEDURE — 99214 OFFICE O/P EST MOD 30 MIN: CPT | Performed by: FAMILY MEDICINE

## 2021-12-16 RX ORDER — METFORMIN HYDROCHLORIDE 500 MG/1
500 TABLET, EXTENDED RELEASE ORAL
Qty: 90 TABLET | Refills: 1 | Status: SHIPPED | OUTPATIENT
Start: 2021-12-16 | End: 2022-04-22 | Stop reason: DRUGHIGH

## 2021-12-16 RX ORDER — SUMATRIPTAN 100 MG/1
100 TABLET, FILM COATED ORAL
Qty: 30 TABLET | Refills: 1 | Status: SHIPPED | OUTPATIENT
Start: 2021-12-16 | End: 2022-06-20 | Stop reason: SDUPTHER

## 2021-12-16 RX ORDER — PROPYLENE GLYCOL 0.06 MG/ML
1 SOLUTION/ DROPS OPHTHALMIC 2 TIMES DAILY PRN
Qty: 15 ML | Refills: 3 | Status: SHIPPED | OUTPATIENT
Start: 2021-12-16 | End: 2022-08-09 | Stop reason: SDUPTHER

## 2021-12-16 RX ORDER — ERGOCALCIFEROL 1.25 MG/1
50000 CAPSULE ORAL
Qty: 12 CAPSULE | Refills: 1 | Status: SHIPPED | OUTPATIENT
Start: 2021-12-16 | End: 2022-06-20 | Stop reason: SDUPTHER

## 2021-12-16 RX ORDER — METHOCARBAMOL 500 MG/1
500 TABLET, FILM COATED ORAL 4 TIMES DAILY
Qty: 360 TABLET | Refills: 1 | Status: SHIPPED | OUTPATIENT
Start: 2021-12-16 | End: 2022-08-09 | Stop reason: SDUPTHER

## 2021-12-16 NOTE — TELEPHONE ENCOUNTER
Patient called and is needing a refill on his systane eye drops. Talked to Dr. Sadler about it this morning..and was told to call  [facility]

## 2022-04-20 NOTE — PROGRESS NOTES
Chief Complaint  No chief complaint on file.    Subjective     {Problem List  Visit Diagnosis   Encounters  Notes  Medications  Labs  Result Review Imaging  Media :23}     Hank Peacock presents to [facility] [place]  History of Present Illness  Pt is 45 yo male with management of obesity, major depression, HTN, HLP, ED, migraine headaches, CATA,  Hypogonadism oligospermia, low testosterone, panic attack, PTSD, WILLA on CPAP, prediabetes         9/23/21 in office visit for recheck on pt's above medical issues. Pt had labwork done on 8/27/21 that shwoed normal thyroid studies. Vitamin D and b12 levels normal hep C antibody test negative. Lipid panel showed LDL at 134 HDL at 31 and triglcyerides at 191. hga1c was at 6.10 CBC showed normal hemoglobin. Lipid panel showed triglcyerides at 191 HDL at 31 and LDL at 131. Pt continues to take his medications for his psych issues, depression, migraine headaches, chronic pain, ED, vitamin D deficiency, HTN.  He is fair. Mentally he is doing okay.       12/16/21 in office visit for recheck on pt's above medical issue. Pt cotninues to take his medications for major depression, ED, vitamin D deficiency, migraine headaches, prediabetes, panic attacks PTSD, major depression, HTN.  Pt is doing fair. He recently had a panic attack.  Currently not suicide.      Hypertension  This is a chronic problem. The current episode started more than 1 year ago. The problem is unchanged. The problem is uncontrolled. Pertinent negatives include no anxiety, blurred vision, chest pain, headaches or shortness of breath. Risk factors for coronary artery disease include male gender, obesity and dyslipidemia. Past treatments include ACE inhibitors. Current antihypertension treatment includes ACE inhibitors. The current treatment provides no improvement. There is no history of angina, kidney disease, CAD/MI, CVA, heart failure, left ventricular  hypertrophy, PVD or retinopathy. There is no history of chronic renal disease, coarctation of the aorta, hyperaldosteronism, hypercortisolism, hyperparathyroidism, a hypertension causing med, pheochromocytoma, renovascular disease, sleep apnea or a thyroid problem.   Depression  Visit Type:followup   Onset of symptoms: at an unknown time  Progression since onset: waxing and waning  Patient presents with the following symptoms: anhedonia, depressed mood and nervousness/anxiety.  Patient is not experiencing: chest pain, choking sensation, compulsions, confusion, decreased concentration, dry mouth, excessive worry, fatigue, feelings of hopelessness, feelings of worthlessness, hypersomnia, hyperventilation, impotence, insomnia, irritability, malaise, memory impairment, muscle tension, nausea, obsessions, shortness of breath and weight loss.  Severity: moderate   Sleep quality: fair  Nighttime awakenings: none  Patient has a history of: anxiety/panic attacks and depression  No history of: anemia, arrhythmia, asthma, bipolar disorder, CHF, chronic lung disease and fibromyalgia  Treatment tried: SSRI  Compliance with treatment: variable  Improvement on treatment: moderate   Migraine   This is a chronic problem. The current episode started more than 1 year ago. The problem occurs constantly. The problem has been unchanged. The quality of the pain is described as aching. Pertinent negatives include no blurred vision, coughing, fever, insomnia, nausea, numbness, rhinorrhea, sinus pressure, sore throat, vomiting, weakness or weight loss. The symptoms are aggravated by activity. Treatments tried: imitrex  The treatment provided mild relief. His past medical history is significant for hypertension and obesity. There is no history of cancer, cluster headaches, immunosuppression, migraine headaches, migraines in the family or pseudotumor cerebri. (Oligospermia, hyopgonadism )   Male  Problem  This is a recurrent problem. The  problem occurs constantly. The problem has been unchanged. Pertinent negatives include no chest pain, chills, coughing, diarrhea, fever, headaches, nausea, shortness of breath, sore throat or vomiting. There is no history of BPH, chlamydia, cryptorchidism, erectile aid use, erectile dysfunction, a femoral hernia, gonorrhea, herpes simplex, HIV, an inguinal hernia, kidney stones, prostatitis, sickle cell disease, syphilis or varicocele. (Oligospermia, hyopgonadism )   Obesity  This is a chronic problem. The current episode started more than 1 year ago. The problem occurs constantly. The problem has been unchanged. Associated symptoms include arthralgias and fatigue. Pertinent negatives include no chest pain, chills, congestion, coughing, diaphoresis, fever, headaches, nausea, numbness, sore throat, vomiting or weakness. Nothing aggravates the symptoms. The treatment provided no relief.   Objective   Vital Signs:   There were no vitals taken for this visit.    {BMI is above normal parameters. Recommendations (Optional):[id number]}       Physical Exam   Result Review :{Labs  Result Review  Imaging  Med Tab  Media  Procedures :23}   {The following data was reviewed by (Optional):93531}  {Ambulatory Labs (Optional):17006}  {Data reviewed (Optional):35979:::1}          Assessment and Plan {CC Problem List  Visit Diagnosis   ROS  Review (Popup)  [facility] Maintenance  Quality  BestPractice  Medications  SmartSets  SnapShot Encounters  Media :23}   There are no diagnoses linked to this encounter.      -went over labwork   -annual physical exam today  -recommend tdap vaccination  -recommend COVID-19 vaccination  -recommend colonoscopy screening - [facility]   -prediabetes - prediabetes meal plan information provided   -WILLA on cpap   -prediabetes - prediabetes meal plan. On metformin  mg PO q daiy.   -multiple joint pain - on ibuprofen PRN, on robaxin at bedtime. Will refer to chiorpractor   -obesity  - counseled weight loss >5 mintues BMI at 35.27   -HTN - on lisinopril on norvasc  -HLP - recommend heart healthy diet.    -major depression/PTSD/CATA - on zoloft 100 mg daily on vistaril PRN on remeron 15 mg PO q daily.   -migraine - imitrex PRN   -ED - on cialis PRN   -oligospermia/hyopogonadism/low testosterone - was seeing Endocirnology at [facility]   -advised pt to be safe and call with questions and concerns  -advised pt to go to ER or call 911 if symptoms worrisome or severe  -advised pt to followup with specialist and referrals  -advised pt to be safe during COVID-19 pandemic  I spent 45 minutes caring for Hank on this date of service. This time includes time spent by me in the following activities: preparing for the visit, reviewing tests, obtaining and/or reviewing a separately obtained history, performing a medically appropriate examination and/or evaluation, counseling and educating the patient/family/caregiver, ordering medications, tests, or procedures, referring and communicating with other health care professionals, documenting information in the medical record, independently interpreting results and communicating that information with the patient/family/caregiver and care coordination.     {Time Spent (Optional):11305}  Follow Up {Instructions Charge Capture  Follow-up Communications :23}  No follow-ups on file.  Patient was given instructions and counseling regarding his condition or for health maintenance advice. Please see specific information pulled into the AVS if appropriate.

## 2022-04-21 ENCOUNTER — LAB (OUTPATIENT)
Dept: LAB | Facility: HOSPITAL | Age: 47
End: 2022-04-21

## 2022-04-21 DIAGNOSIS — E66.01 CLASS 2 SEVERE OBESITY WITH SERIOUS COMORBIDITY AND BODY MASS INDEX (BMI) OF 35.0 TO 35.9 IN ADULT, UNSPECIFIED OBESITY TYPE: ICD-10-CM

## 2022-04-21 DIAGNOSIS — E78.2 MIXED HYPERLIPIDEMIA: ICD-10-CM

## 2022-04-21 DIAGNOSIS — Z12.11 ENCOUNTER FOR SCREENING COLONOSCOPY: ICD-10-CM

## 2022-04-21 DIAGNOSIS — F33.1 MODERATE EPISODE OF RECURRENT MAJOR DEPRESSIVE DISORDER: ICD-10-CM

## 2022-04-21 DIAGNOSIS — F43.10 PTSD (POST-TRAUMATIC STRESS DISORDER): ICD-10-CM

## 2022-04-21 DIAGNOSIS — R73.03 PREDIABETES: ICD-10-CM

## 2022-04-21 DIAGNOSIS — N52.9 ERECTILE DYSFUNCTION, UNSPECIFIED ERECTILE DYSFUNCTION TYPE: ICD-10-CM

## 2022-04-21 DIAGNOSIS — I10 ESSENTIAL HYPERTENSION: ICD-10-CM

## 2022-04-21 LAB
25(OH)D3 SERPL-MCNC: 32.4 NG/ML (ref 30–100)
ALBUMIN SERPL-MCNC: 4.8 G/DL (ref 3.5–5.2)
ALBUMIN/GLOB SERPL: 1.7 G/DL
ALP SERPL-CCNC: 76 U/L (ref 39–117)
ALT SERPL W P-5'-P-CCNC: 41 U/L (ref 1–41)
ANION GAP SERPL CALCULATED.3IONS-SCNC: 11.6 MMOL/L (ref 5–15)
AST SERPL-CCNC: 28 U/L (ref 1–40)
BASOPHILS # BLD AUTO: 0.04 10*3/MM3 (ref 0–0.2)
BASOPHILS NFR BLD AUTO: 0.7 % (ref 0–1.5)
BILIRUB SERPL-MCNC: 0.4 MG/DL (ref 0–1.2)
BUN SERPL-MCNC: 22 MG/DL (ref 6–20)
BUN/CREAT SERPL: 17.6 (ref 7–25)
CALCIUM SPEC-SCNC: 9.8 MG/DL (ref 8.6–10.5)
CHLORIDE SERPL-SCNC: 103 MMOL/L (ref 98–107)
CHOLEST SERPL-MCNC: 204 MG/DL (ref 0–200)
CO2 SERPL-SCNC: 25.4 MMOL/L (ref 22–29)
CREAT SERPL-MCNC: 1.25 MG/DL (ref 0.76–1.27)
DEPRECATED RDW RBC AUTO: 38.9 FL (ref 37–54)
EGFRCR SERPLBLD CKD-EPI 2021: 71.5 ML/MIN/1.73
EOSINOPHIL # BLD AUTO: 0.08 10*3/MM3 (ref 0–0.4)
EOSINOPHIL NFR BLD AUTO: 1.4 % (ref 0.3–6.2)
ERYTHROCYTE [DISTWIDTH] IN BLOOD BY AUTOMATED COUNT: 12.8 % (ref 12.3–15.4)
GLOBULIN UR ELPH-MCNC: 2.9 GM/DL
GLUCOSE SERPL-MCNC: 109 MG/DL (ref 65–99)
HBA1C MFR BLD: 6.5 % (ref 4.8–5.6)
HCT VFR BLD AUTO: 43.5 % (ref 37.5–51)
HDLC SERPL-MCNC: 35 MG/DL (ref 40–60)
HGB BLD-MCNC: 15.3 G/DL (ref 13–17.7)
IMM GRANULOCYTES # BLD AUTO: 0.02 10*3/MM3 (ref 0–0.05)
IMM GRANULOCYTES NFR BLD AUTO: 0.4 % (ref 0–0.5)
LDLC SERPL CALC-MCNC: 140 MG/DL (ref 0–100)
LDLC/HDLC SERPL: 3.91 {RATIO}
LYMPHOCYTES # BLD AUTO: 1.96 10*3/MM3 (ref 0.7–3.1)
LYMPHOCYTES NFR BLD AUTO: 34.6 % (ref 19.6–45.3)
MCH RBC QN AUTO: 29.7 PG (ref 26.6–33)
MCHC RBC AUTO-ENTMCNC: 35.2 G/DL (ref 31.5–35.7)
MCV RBC AUTO: 84.5 FL (ref 79–97)
MONOCYTES # BLD AUTO: 0.5 10*3/MM3 (ref 0.1–0.9)
MONOCYTES NFR BLD AUTO: 8.8 % (ref 5–12)
NEUTROPHILS NFR BLD AUTO: 3.06 10*3/MM3 (ref 1.7–7)
NEUTROPHILS NFR BLD AUTO: 54.1 % (ref 42.7–76)
NRBC BLD AUTO-RTO: 0 /100 WBC (ref 0–0.2)
PLATELET # BLD AUTO: 225 10*3/MM3 (ref 140–450)
PMV BLD AUTO: 9.6 FL (ref 6–12)
POTASSIUM SERPL-SCNC: 4.3 MMOL/L (ref 3.5–5.2)
PROT SERPL-MCNC: 7.7 G/DL (ref 6–8.5)
RBC # BLD AUTO: 5.15 10*6/MM3 (ref 4.14–5.8)
SODIUM SERPL-SCNC: 140 MMOL/L (ref 136–145)
TRIGL SERPL-MCNC: 161 MG/DL (ref 0–150)
VIT B12 BLD-MCNC: 677 PG/ML (ref 211–946)
VLDLC SERPL-MCNC: 29 MG/DL (ref 5–40)
WBC NRBC COR # BLD: 5.66 10*3/MM3 (ref 3.4–10.8)

## 2022-04-21 PROCEDURE — 83036 HEMOGLOBIN GLYCOSYLATED A1C: CPT

## 2022-04-21 PROCEDURE — 80053 COMPREHEN METABOLIC PANEL: CPT

## 2022-04-21 PROCEDURE — 85025 COMPLETE CBC W/AUTO DIFF WBC: CPT

## 2022-04-21 PROCEDURE — 82306 VITAMIN D 25 HYDROXY: CPT

## 2022-04-21 PROCEDURE — 82607 VITAMIN B-12: CPT

## 2022-04-21 PROCEDURE — 80061 LIPID PANEL: CPT

## 2022-04-22 ENCOUNTER — TELEPHONE (OUTPATIENT)
Dept: FAMILY MEDICINE CLINIC | Facility: CLINIC | Age: 47
End: 2022-04-22

## 2022-04-22 RX ORDER — ATORVASTATIN CALCIUM 20 MG/1
20 TABLET, FILM COATED ORAL NIGHTLY
Qty: 90 TABLET | Refills: 0 | Status: SHIPPED | OUTPATIENT
Start: 2022-04-22 | End: 2022-07-18 | Stop reason: SDUPTHER

## 2022-04-22 NOTE — TELEPHONE ENCOUNTER
----- Message from Clarence Sadler MD sent at 4/22/2022 12:55 PM CDT -----  Please call pt    Hga1c up from 6.10 to 6.5.  pt is now considered diabetic. He is to continue watching sugar and carb intake. Recommend going up on metformin XR from 500 to 1000 mg PO q daily. Give 30 pills and 3 refills    Also cholesterol levels high and recommend starting on lipitor 20 mg PO qhs give 30 pills and 3 refills. May cause muscle aches and pains. Recommend heart healthy diet    Recheck on next visit thanks

## 2022-04-25 ENCOUNTER — TELEPHONE (OUTPATIENT)
Dept: FAMILY MEDICINE CLINIC | Facility: CLINIC | Age: 47
End: 2022-04-25

## 2022-04-28 ENCOUNTER — OFFICE VISIT (OUTPATIENT)
Dept: FAMILY MEDICINE CLINIC | Facility: CLINIC | Age: 47
End: 2022-04-28

## 2022-04-28 VITALS
OXYGEN SATURATION: 97 % | HEART RATE: 67 BPM | WEIGHT: 246.8 LBS | SYSTOLIC BLOOD PRESSURE: 128 MMHG | BODY MASS INDEX: 35.33 KG/M2 | DIASTOLIC BLOOD PRESSURE: 72 MMHG | HEIGHT: 70 IN | TEMPERATURE: 98.8 F

## 2022-04-28 DIAGNOSIS — Z12.11 ENCOUNTER FOR SCREENING COLONOSCOPY: ICD-10-CM

## 2022-04-28 DIAGNOSIS — E11.9 ENCOUNTER FOR DIABETES TYPE 2 EYE EXAM: ICD-10-CM

## 2022-04-28 DIAGNOSIS — E78.2 MIXED HYPERLIPIDEMIA: ICD-10-CM

## 2022-04-28 DIAGNOSIS — I10 ESSENTIAL HYPERTENSION: ICD-10-CM

## 2022-04-28 DIAGNOSIS — E66.01 CLASS 2 SEVERE OBESITY WITH SERIOUS COMORBIDITY AND BODY MASS INDEX (BMI) OF 35.0 TO 35.9 IN ADULT, UNSPECIFIED OBESITY TYPE: ICD-10-CM

## 2022-04-28 DIAGNOSIS — E11.8 CONTROLLED TYPE 2 DIABETES MELLITUS WITH COMPLICATION, WITHOUT LONG-TERM CURRENT USE OF INSULIN: Primary | ICD-10-CM

## 2022-04-28 DIAGNOSIS — Z01.00 ENCOUNTER FOR DIABETES TYPE 2 EYE EXAM: ICD-10-CM

## 2022-04-28 PROCEDURE — 99214 OFFICE O/P EST MOD 30 MIN: CPT | Performed by: FAMILY MEDICINE

## 2022-04-28 RX ORDER — LISINOPRIL 20 MG/1
20 TABLET ORAL DAILY
Qty: 30 TABLET | Refills: 3 | Status: SHIPPED | OUTPATIENT
Start: 2022-04-28 | End: 2022-08-09 | Stop reason: SDUPTHER

## 2022-04-28 NOTE — PROGRESS NOTES
Subjective:  Hank Peacock is a 47 y.o. male who presents for       Patient Active Problem List   Diagnosis   • Hypogonadism in male   • Oligospermia   • Essential hypertension   • Class 2 severe obesity with serious comorbidity and body mass index (BMI) of 35.0 to 35.9 in adult (HCC)   • Migraine without status migrainosus, not intractable   • Moderate episode of recurrent major depressive disorder (HCC)   • Prediabetes   • Mixed hyperlipidemia           Current Outpatient Medications:   •  amLODIPine (NORVASC) 10 MG tablet, Take 10 mg by mouth Daily., Disp: , Rfl:   •  atorvastatin (Lipitor) 20 MG tablet, Take 1 tablet by mouth Every Night., Disp: 90 tablet, Rfl: 0  •  hydrOXYzine (ATARAX) 25 MG tablet, Take 25 mg by mouth 3 (Three) Times a Day As Needed for Itching., Disp: , Rfl:   •  ibuprofen (ADVIL,MOTRIN) 600 MG tablet, Take 1 tablet by mouth Every 6 (Six) Hours As Needed for Mild Pain ., Disp: 30 tablet, Rfl: 3  •  lisinopril (PRINIVIL,ZESTRIL) 20 MG tablet, Take 1 tablet by mouth Daily., Disp: 30 tablet, Rfl: 3  •  metFORMIN (GLUCOPHAGE) 1000 MG tablet, Take 1 tablet by mouth Daily With Breakfast., Disp: 90 tablet, Rfl: 0  •  methocarbamol (ROBAXIN) 500 MG tablet, Take 1 tablet by mouth 4 (Four) Times a Day., Disp: 360 tablet, Rfl: 1  •  mirtazapine (REMERON) 15 MG tablet, Take 15 mg by mouth Every Night., Disp: , Rfl:   •  multivitamin (THERAGRAN) tablet tablet, Take 1 tablet by mouth Daily., Disp: , Rfl:   •  prazosin (MINIPRESS) 5 MG capsule, Take 5 mg by mouth Every Night., Disp: , Rfl:   •  Propylene Glycol (Systane Complete) 0.6 % solution, Apply 1 application to eye(s) as directed by provider 2 (Two) Times a Day As Needed (dry eyes)., Disp: 15 mL, Rfl: 3  •  sertraline (ZOLOFT) 100 MG tablet, Take 100 mg by mouth Daily., Disp: , Rfl:   •  SUMAtriptan (IMITREX) 100 MG tablet, Take 1 tablet by mouth Every 2 (Two) Hours As Needed for Migraine. Take one tablet at onset of headache. May repeat dose one  time in 2 hours if headache, Disp: 30 tablet, Rfl: 1  •  vitamin D (ERGOCALCIFEROL) 1.25 MG (02379 UT) capsule capsule, Take 1 capsule by mouth Every 7 (Seven) Days., Disp: 12 capsule, Rfl: 1  Pt is 48 yo male with management of obesity, major depression, HTN, HLP, ED, migraine headaches, CATA,  Hypogonadism oligospermia, low testosterone, panic attack, PTSD, WILLA on CPAP, prediabetes      12/16/21 in office visit for recheck on pt's above medical issue. Pt cotninues to take his medications for major depression, ED, vitamin D deficiency, migraine headaches, prediabetes, panic attacks PTSD, major depression, HTN.  Pt is doing fair. He recently had a panic attack.  Currently not suicide.     4/28/22 in office visit for recheck. Pt had lbwork done on 4/21/22 that showed LDL at 140 HDL at 35 triglcyerides at 161 total cholesterol at 204. hga1c is at 6.50. CMP showed glucose at 109 BUN at 22. He has been stressed for past 6 months. His new born daughter has multiple medical issues. His diet has not been the best.        Diabetes  He presents for his initial diabetic visit. He has type 2 diabetes mellitus. His disease course has been stable. Pertinent negatives for hypoglycemia include no headaches. Pertinent negatives for diabetes include no blurred vision, no chest pain, no fatigue, no foot paresthesias, no foot ulcerations, no polydipsia, no polyphagia, no polyuria, no visual change, no weakness and no weight loss. Risk factors for coronary artery disease include diabetes mellitus, dyslipidemia, hypertension, male sex and obesity. Current diabetic treatment includes oral agent (dual therapy) and oral agent (monotherapy). He is compliant with treatment most of the time. He is following a diabetic diet. There is no change in his home blood glucose trend. An ACE inhibitor/angiotensin II receptor blocker is being taken. He does not see a podiatrist.Eye exam is not current.   Hyperlipidemia  This is a chronic problem. The  current episode started more than 1 year ago. Recent lipid tests were reviewed and are high. Exacerbating diseases include diabetes and obesity. He has no history of chronic renal disease. Pertinent negatives include no chest pain or shortness of breath. Current antihyperlipidemic treatment includes statins. The current treatment provides moderate improvement of lipids. Compliance problems include adherence to diet.  Risk factors for coronary artery disease include diabetes mellitus, hypertension, male sex, dyslipidemia, a sedentary lifestyle and obesity.     Hypertension  This is a chronic problem. The current episode started more than 1 year ago. The problem is unchanged. The problem is uncontrolled. Pertinent negatives include no anxiety, blurred vision, chest pain, headaches or shortness of breath. Risk factors for coronary artery disease include male gender, obesity and dyslipidemia. Past treatments include ACE inhibitors. Current antihypertension treatment includes ACE inhibitors. The current treatment provides no improvement. There is no history of angina, kidney disease, CAD/MI, CVA, heart failure, left ventricular hypertrophy, PVD or retinopathy. There is no history of chronic renal disease, coarctation of the aorta, hyperaldosteronism, hypercortisolism, hyperparathyroidism, a hypertension causing med, pheochromocytoma, renovascular disease, sleep apnea or a thyroid problem.   Depression  Visit Type:followup   Onset of symptoms: at an unknown time  Progression since onset: waxing and waning  Patient presents with the following symptoms: anhedonia, depressed mood and nervousness/anxiety.  Patient is not experiencing: chest pain, choking sensation, compulsions, confusion, decreased concentration, dry mouth, excessive worry, fatigue, feelings of hopelessness, feelings of worthlessness, hypersomnia, hyperventilation, impotence, insomnia, irritability, malaise, memory impairment, muscle  tension, nausea, obsessions, shortness of breath and weight loss.  Severity: moderate   Sleep quality: fair  Nighttime awakenings: none  Patient has a history of: anxiety/panic attacks and depression  No history of: anemia, arrhythmia, asthma, bipolar disorder, CHF, chronic lung disease and fibromyalgia  Treatment tried: SSRI  Compliance with treatment: variable  Improvement on treatment: moderate   Migraine   This is a chronic problem. The current episode started more than 1 year ago. The problem occurs constantly. The problem has been unchanged. The quality of the pain is described as aching. Pertinent negatives include no blurred vision, coughing, fever, insomnia, nausea, numbness, rhinorrhea, sinus pressure, sore throat, vomiting, weakness or weight loss. The symptoms are aggravated by activity. Treatments tried: imitrex  The treatment provided mild relief. His past medical history is significant for hypertension and obesity. There is no history of cancer, cluster headaches, immunosuppression, migraine headaches, migraines in the family or pseudotumor cerebri. (Oligospermia, hyopgonadism )   Male  Problem  This is a recurrent problem. The problem occurs constantly. The problem has been unchanged. Pertinent negatives include no chest pain, chills, coughing, diarrhea, fever, headaches, nausea, shortness of breath, sore throat or vomiting. There is no history of BPH, chlamydia, cryptorchidism, erectile aid use, erectile dysfunction, a femoral hernia, gonorrhea, herpes simplex, HIV, an inguinal hernia, kidney stones, prostatitis, sickle cell disease, syphilis or varicocele. (Oligospermia, hyopgonadism )   Obesity  This is a chronic problem. The current episode started more than 1 year ago. The problem occurs constantly. The problem has been unchanged. Associated symptoms include arthralgias and fatigue. Pertinent negatives include no chest  pain, chills, congestion, coughing, diaphoresis, fever, headaches, nausea, numbness, sore throat, vomiting or weakness. Nothing aggravates the symptoms. The treatment provided no relief.     Review of Systems  Review of Systems   Constitutional: Negative for activity change, appetite change, chills, diaphoresis, fatigue, fever and weight loss.   HENT: Negative for congestion, postnasal drip, rhinorrhea, sinus pressure, sinus pain, sneezing, sore throat, trouble swallowing and voice change.    Eyes: Negative for blurred vision.   Respiratory: Negative for cough, choking, chest tightness, shortness of breath, wheezing and stridor.    Cardiovascular: Negative for chest pain.   Gastrointestinal: Negative for diarrhea, nausea and vomiting.   Endocrine: Negative for polydipsia, polyphagia and polyuria.   Neurological: Negative for weakness and headaches.       Patient Active Problem List   Diagnosis   • Hypogonadism in male   • Oligospermia   • Essential hypertension   • Class 2 severe obesity with serious comorbidity and body mass index (BMI) of 35.0 to 35.9 in adult ([facility])   • Migraine without status migrainosus, not intractable   • Moderate episode of recurrent major depressive disorder ([facility])   • Prediabetes   • Mixed hyperlipidemia     History reviewed. No pertinent surgical history.  Social History     Socioeconomic History   • Marital status:    Tobacco Use   • Smoking status: Never Smoker   • Smokeless tobacco: Never Used   Substance and Sexual Activity   • Alcohol use: Not Currently   • Drug use: Not Currently   • Sexual activity: Defer     Family History   Problem Relation Age of Onset   • Diabetes Mother      Lab on 04/21/2022   Component Date Value Ref Range Status   • WBC 04/21/2022 5.66  3.40 - 10.80 10*3/mm3 Final   • RBC 04/21/2022 5.15  4.14 - 5.80 10*6/mm3 Final   • Hemoglobin 04/21/2022 15.3  13.0 - 17.7 g/dL Final   • Hematocrit 04/21/2022 43.5  37.5 - 51.0 % Final   • MCV 04/21/2022 84.5  79.0 -  97.0 fL Final   • MCH 04/21/2022 29.7  26.6 - 33.0 pg Final   • MCHC 04/21/2022 35.2  31.5 - 35.7 g/dL Final   • RDW 04/21/2022 12.8  12.3 - 15.4 % Final   • RDW-SD 04/21/2022 38.9  37.0 - 54.0 fl Final   • MPV 04/21/2022 9.6  6.0 - 12.0 fL Final   • Platelets 04/21/2022 225  140 - 450 10*3/mm3 Final   • Neutrophil % 04/21/2022 54.1  42.7 - 76.0 % Final   • Lymphocyte % 04/21/2022 34.6  19.6 - 45.3 % Final   • Monocyte % 04/21/2022 8.8  5.0 - 12.0 % Final   • Eosinophil % 04/21/2022 1.4  0.3 - 6.2 % Final   • Basophil % 04/21/2022 0.7  0.0 - 1.5 % Final   • Immature Grans % 04/21/2022 0.4  0.0 - 0.5 % Final   • Neutrophils, Absolute 04/21/2022 3.06  1.70 - 7.00 10*3/mm3 Final   • Lymphocytes, Absolute 04/21/2022 1.96  0.70 - 3.10 10*3/mm3 Final   • Monocytes, Absolute 04/21/2022 0.50  0.10 - 0.90 10*3/mm3 Final   • Eosinophils, Absolute 04/21/2022 0.08  0.00 - 0.40 10*3/mm3 Final   • Basophils, Absolute 04/21/2022 0.04  0.00 - 0.20 10*3/mm3 Final   • Immature Grans, Absolute 04/21/2022 0.02  0.00 - 0.05 10*3/mm3 Final   • nRBC 04/21/2022 0.0  0.0 - 0.2 /100 WBC Final   • Glucose 04/21/2022 109 (A) 65 - 99 mg/dL Final   • BUN 04/21/2022 22 (A) 6 - 20 mg/dL Final   • Creatinine 04/21/2022 1.25  0.76 - 1.27 mg/dL Final   • Sodium 04/21/2022 140  136 - 145 mmol/L Final   • Potassium 04/21/2022 4.3  3.5 - 5.2 mmol/L Final   • Chloride 04/21/2022 103  98 - 107 mmol/L Final   • CO2 04/21/2022 25.4  22.0 - 29.0 mmol/L Final   • Calcium 04/21/2022 9.8  8.6 - 10.5 mg/dL Final   • Total Protein 04/21/2022 7.7  6.0 - 8.5 g/dL Final   • Albumin 04/21/2022 4.80  3.50 - 5.20 g/dL Final   • ALT (SGPT) 04/21/2022 41  1 - 41 U/L Final   • AST (SGOT) 04/21/2022 28  1 - 40 U/L Final   • Alkaline Phosphatase 04/21/2022 76  39 - 117 U/L Final   • Total Bilirubin 04/21/2022 0.4  0.0 - 1.2 mg/dL Final   • Globulin 04/21/2022 2.9  gm/dL Final   • A/G Ratio 04/21/2022 1.7  g/dL Final   • BUN/Creatinine Ratio 04/21/2022 17.6  7.0 - 25.0 Final  "  • Anion Gap 04/21/2022 11.6  5.0 - 15.0 mmol/L Final   • eGFR 04/21/2022 71.5  >60.0 mL/min/1.73 Final    National Kidney Foundation and American Society of Nephrology (ASN) Task Force recommended calculation based on the Chronic Kidney Disease Epidemiology Collaboration (CKD-EPI) equation refit without adjustment for race.   • Hemoglobin A1C 04/21/2022 6.50 (A) 4.80 - 5.60 % Final   • Total Cholesterol 04/21/2022 204 (A) 0 - 200 mg/dL Final   • Triglycerides 04/21/2022 161 (A) 0 - 150 mg/dL Final   • HDL Cholesterol 04/21/2022 35 (A) 40 - 60 mg/dL Final   • LDL Cholesterol  04/21/2022 140 (A) 0 - 100 mg/dL Final   • VLDL Cholesterol 04/21/2022 29  5 - 40 mg/dL Final   • LDL/HDL Ratio 04/21/2022 3.91   Final   • 25 Hydroxy, Vitamin D 04/21/2022 32.4  30.0 - 100.0 ng/ml Final   • Vitamin B-12 04/21/2022 677  211 - 946 pg/mL Final      No image results found.    [unfilled]  Immunization History   Administered Date(s) Administered   • COVID-19 (MODERNA) 1st, 2nd, 3rd Dose Only 04/22/2021, 05/18/2021   • FluLaval/Fluarix/Fluzone >6 09/23/2021       The following portions of the patient's history were reviewed and updated as appropriate: allergies, current medications, past family history, past medical history, past social history, past surgical history and problem list.        Physical Exam  /72 (BP Location: Left arm, Patient Position: Sitting, Cuff Size: Adult)   Pulse 67   Temp 98.8 °F (37.1 °C)   Ht 177.8 cm (70\")   Wt 112 kg (246 lb 12.8 oz)   SpO2 97%   BMI 35.41 kg/m²     Physical Exam  Vitals and nursing note reviewed.   Constitutional:       Appearance: He is well-developed. He is not diaphoretic.   HENT:      Head: Normocephalic and atraumatic.      Right Ear: External ear normal.   Eyes:      Conjunctiva/sclera: Conjunctivae normal.      Pupils: Pupils are equal, round, and reactive to light.   Cardiovascular:      Rate and Rhythm: Normal rate and regular rhythm.      Heart sounds: Normal " heart sounds. No murmur heard.  Pulmonary:      Effort: Pulmonary effort is normal. No respiratory distress.      Breath sounds: Normal breath sounds.   Abdominal:      General: Bowel sounds are normal. There is no distension.      Palpations: Abdomen is soft.      Tenderness: There is no abdominal tenderness.   Musculoskeletal:         General: No deformity. Normal range of motion.      Cervical back: Normal range of motion and neck supple.   Skin:     General: Skin is warm.      Coloration: Skin is not pale.      Findings: No erythema or rash.   Neurological:      Mental Status: He is alert and oriented to person, place, and time.      Cranial Nerves: No cranial nerve deficit.   Psychiatric:         Behavior: Behavior normal.         Assessment/Plan    Diagnosis Plan   1. Encounter for diabetes type 2 eye exam ([facility])  Ambulatory Referral for Diabetic Eye Exam-Ophthalmology   2. Mixed hyperlipidemia  CBC Auto Differential    Comprehensive Metabolic Panel    Hemoglobin A1c    Lipid Panel    Vitamin D 25 Hydroxy    Vitamin B12    Microalbumin / Creatinine Urine Ratio - Urine, Clean Catch   3. Essential hypertension  CBC Auto Differential    Comprehensive Metabolic Panel    Hemoglobin A1c    Lipid Panel    Vitamin D 25 Hydroxy    Vitamin B12    Microalbumin / Creatinine Urine Ratio - Urine, Clean Catch   4. Controlled type 2 diabetes mellitus with complication, without long-term current use of insulin ([facility])  CBC Auto Differential    Comprehensive Metabolic Panel    Hemoglobin A1c    Lipid Panel    Vitamin D 25 Hydroxy    Vitamin B12    Microalbumin / Creatinine Urine Ratio - Urine, Clean Catch   5. Class 2 severe obesity with serious comorbidity and body mass index (BMI) of 35.0 to 35.9 in adult, unspecified obesity type ([facility])     6. Encounter for screening colonoscopy  Ambulatory Referral to Gastroenterology              -went over labwork   -recommend tdap vaccination  -recommend COVID-19 vaccination  -[facility]   -recommend colonoscopy screening - [facility]   -DM type 2 - on metformin 1000 mg PO q daily.    -WILLA on cpap   -prediabetes - prediabetes meal plan. On metformin  mg PO q daiy.   -multiple joint pain - on ibuprofen PRN, on robaxin at bedtime. Will refer to chiorpractor   -obesity - counseled weight loss >5 mintues BMI at 35.41   -HTN - on lisinopril on norvasc  -HLP - recommend heart healthy diet.  on lipitor 20 mg PO qhs   -major depression/PTSD/CATA - on zoloft 100 mg daily on vistaril PRN on remeron 15 mg PO q daily.   -migraine - imitrex PRN   -ED - on cialis PRN   -oligospermia/hyopogonadism/low testosterone - was seeing Endocirnology at [facility]   -advised pt to be safe and call with questions and concerns  -advised pt to go to ER or call 911 if symptoms worrisome or severe  -advised pt to followup with specialist and referrals  -advised pt to be safe during COVID-19 pandemic  I spent 45 minutes caring for Hank on this date of service. This time includes time spent by me in the following activities: preparing for the visit, reviewing tests, obtaining and/or reviewing a separately obtained history, performing a medically appropriate examination and/or evaluation, counseling and educating the patient/family/caregiver, ordering medications, tests, or procedures, referring and communicating with other health care professionals, documenting information in the medical record, independently interpreting results and communicating that information with the patient/family/caregiver and care coordination.  -recheck in 3 months          This document has been electronically signed by Clarence Sadler MD on April 28, 2022 10:41 CDT

## 2022-06-20 RX ORDER — ERGOCALCIFEROL 1.25 MG/1
50000 CAPSULE ORAL
Qty: 12 CAPSULE | Refills: 0 | Status: SHIPPED | OUTPATIENT
Start: 2022-06-20 | End: 2022-08-25 | Stop reason: SDUPTHER

## 2022-06-20 RX ORDER — SUMATRIPTAN 100 MG/1
100 TABLET, FILM COATED ORAL
Qty: 30 TABLET | Refills: 1 | Status: SHIPPED | OUTPATIENT
Start: 2022-06-20 | End: 2022-08-25 | Stop reason: SDUPTHER

## 2022-07-19 RX ORDER — ATORVASTATIN CALCIUM 20 MG/1
20 TABLET, FILM COATED ORAL NIGHTLY
Qty: 90 TABLET | Refills: 0 | Status: SHIPPED | OUTPATIENT
Start: 2022-07-19 | End: 2022-10-19 | Stop reason: SDUPTHER

## 2022-08-10 RX ORDER — LISINOPRIL 20 MG/1
20 TABLET ORAL DAILY
Qty: 30 TABLET | Refills: 3 | Status: SHIPPED | OUTPATIENT
Start: 2022-08-10 | End: 2022-08-25 | Stop reason: SDUPTHER

## 2022-08-10 RX ORDER — PROPYLENE GLYCOL 0.06 MG/ML
1 SOLUTION/ DROPS OPHTHALMIC 2 TIMES DAILY PRN
Qty: 15 ML | Refills: 3 | Status: SHIPPED | OUTPATIENT
Start: 2022-08-10 | End: 2022-08-25 | Stop reason: SDUPTHER

## 2022-08-10 RX ORDER — IBUPROFEN 600 MG/1
600 TABLET ORAL EVERY 6 HOURS PRN
Qty: 30 TABLET | Refills: 3 | Status: SHIPPED | OUTPATIENT
Start: 2022-08-10 | End: 2022-08-25 | Stop reason: SDUPTHER

## 2022-08-10 RX ORDER — METHOCARBAMOL 500 MG/1
500 TABLET, FILM COATED ORAL 4 TIMES DAILY
Qty: 360 TABLET | Refills: 1 | Status: SHIPPED | OUTPATIENT
Start: 2022-08-10 | End: 2022-08-25 | Stop reason: SDUPTHER

## 2022-08-16 NOTE — PROGRESS NOTES
Subjective:  Hank Peacock is a 47 y.o. male who presents for       Patient Active Problem List   Diagnosis   • Hypogonadism in male   • Oligospermia   • Essential hypertension   • Class 2 severe obesity with serious comorbidity and body mass index (BMI) of 35.0 to 35.9 in adult ([facility])   • Migraine without status migrainosus, not intractable   • Moderate episode of recurrent major depressive disorder ([facility])   • Prediabetes   • Mixed hyperlipidemia   • Controlled type 2 diabetes mellitus with complication, without long-term current use of insulin ([facility])   • CATA (generalized anxiety disorder)           Current Outpatient Medications:   •  amLODIPine (NORVASC) 10 MG tablet, Take 10 mg by mouth Daily., Disp: , Rfl:   •  atorvastatin (Lipitor) 20 MG tablet, Take 1 tablet by mouth Every Night., Disp: 90 tablet, Rfl: 0  •  hydrOXYzine (ATARAX) 25 MG tablet, Take 25 mg by mouth 3 (Three) Times a Day As Needed for Itching., Disp: , Rfl:   •  ibuprofen (ADVIL,MOTRIN) 600 MG tablet, Take 1 tablet by mouth Every 6 (Six) Hours As Needed for Mild Pain ., Disp: 90 tablet, Rfl: 1  •  lisinopril (PRINIVIL,ZESTRIL) 20 MG tablet, Take 1 tablet by mouth Daily., Disp: 90 tablet, Rfl: 1  •  metFORMIN (Glucophage) 1000 MG tablet, Take 1 tablet by mouth 2 (Two) Times a Day With Meals., Disp: 180 tablet, Rfl: 1  •  methocarbamol (ROBAXIN) 500 MG tablet, Take 1 tablet by mouth Daily., Disp: 90 tablet, Rfl: 1  •  mirtazapine (REMERON) 15 MG tablet, Take 15 mg by mouth Every Night., Disp: , Rfl:   •  multivitamin (THERAGRAN) tablet tablet, Take 1 tablet by mouth Daily., Disp: , Rfl:   •  prazosin (MINIPRESS) 5 MG capsule, Take 5 mg by mouth Every Night., Disp: , Rfl:   •  Propylene Glycol (Systane Complete) 0.6 % solution, Apply 1 application to eye(s) as directed by provider 2 (Two) Times a Day As Needed (dry eyes)., Disp: 15 mL, Rfl: 3  •  sertraline (ZOLOFT) 100 MG tablet, Take 1 tablet by mouth Daily., Disp: 90 tablet, Rfl: 1  •  SUMAtriptan  (IMITREX) 100 MG tablet, Take 1 tablet by mouth Every 2 (Two) Hours As Needed for Migraine. Take one tablet at onset of headache. May repeat dose one time in 2 hours if headache, Disp: 90 tablet, Rfl: 1  •  vitamin D (ERGOCALCIFEROL) 1.25 MG (33233 UT) capsule capsule, Take 1 capsule by mouth Every 7 (Seven) Days., Disp: 12 capsule, Rfl: 0    Pt is 46 yo male with management of obesity, major depression, HTN, HLP, ED, migraine headaches, CATA,  Hypogonadism oligospermia, low testosterone, panic attack, PTSD, WILLA on CPAP, prediabetes, CKD stage 2       4/28/22 in office visit for recheck. Pt had lbwork done on 4/21/22 that showed LDL at 140 HDL at 35 triglcyerides at 161 total cholesterol at 204. hga1c is at 6.50. CMP showed glucose at 109 BUN at 22. He has been stressed for past 6 months. His new born daughter has multiple medical issues. His diet has not been the best.      8/25/22 in office visit for recheck. Pt had labwork done on 8/22/22 that showed HDL at 36. hga1c at 6.40 from 6.50.  CMP showed GR at 73.6.  From 71.5. pt is doing fair. No chest pain BP stable. Breathing stable. He has been under stress with health of daughter. Pt continues to talk to .      Chronic Kidney Disease  This is a chronic problem. The current episode started more than 1 year ago. The problem occurs constantly. The problem has been unchanged. Associated symptoms include fatigue, headaches, numbness and weakness. Pertinent negatives include no abdominal pain, anorexia, arthralgias, change in bowel habit, chest pain, chills, congestion, coughing, diaphoresis, fever, joint swelling, myalgias, nausea, neck pain, rash, sore throat, swollen glands, urinary symptoms, vertigo, visual change or vomiting. Nothing aggravates the symptoms. He has tried nothing for the symptoms. The treatment provided no relief.   Diabetes  He presents for his initial diabetic visit. He has type 2 diabetes mellitus. His disease course has been stable. Pertinent  negatives for hypoglycemia include no headaches. Pertinent negatives for diabetes include no blurred vision, no chest pain, no fatigue, no foot paresthesias, no foot ulcerations, no polydipsia, no polyphagia, no polyuria, no visual change, no weakness and no weight loss. Risk factors for coronary artery disease include diabetes mellitus, dyslipidemia, hypertension, male sex and obesity. Current diabetic treatment includes oral agent (dual therapy) and oral agent (monotherapy). He is compliant with treatment most of the time. He is following a diabetic diet. There is no change in his home blood glucose trend. An ACE inhibitor/angiotensin II receptor blocker is being taken. He does not see a podiatrist.Eye exam is not current.   Hyperlipidemia  This is a chronic problem. The current episode started more than 1 year ago. Recent lipid tests were reviewed and are high. Exacerbating diseases include diabetes and obesity. He has no history of chronic renal disease. Pertinent negatives include no chest pain or shortness of breath. Current antihyperlipidemic treatment includes statins. The current treatment provides moderate improvement of lipids. Compliance problems include adherence to diet.  Risk factors for coronary artery disease include diabetes mellitus, hypertension, male sex, dyslipidemia, a sedentary lifestyle and obesity.    Hypertension  This is a chronic problem. The current episode started more than 1 year ago. The problem is unchanged. The problem is uncontrolled. Pertinent negatives include no anxiety, blurred vision, chest pain, headaches or shortness of breath. Risk factors for coronary artery disease include male gender, obesity and dyslipidemia. Past treatments include ACE inhibitors. Current antihypertension treatment includes ACE inhibitors. The current treatment provides no improvement. There is no history of angina, kidney disease, CAD/MI, CVA, heart failure, left ventricular hypertrophy, PVD or  retinopathy. There is no history of chronic renal disease, coarctation of the aorta, hyperaldosteronism, hypercortisolism, hyperparathyroidism, a hypertension causing med, pheochromocytoma, renovascular disease, sleep apnea or a thyroid problem.   Depression  Visit Type:followup   Onset of symptoms: at an unknown time  Progression since onset: waxing and waning  Patient presents with the following symptoms: anhedonia, depressed mood and nervousness/anxiety.  Patient is not experiencing: chest pain, choking sensation, compulsions, confusion, decreased concentration, dry mouth, excessive worry, fatigue, feelings of hopelessness, feelings of worthlessness, hypersomnia, hyperventilation, impotence, insomnia, irritability, malaise, memory impairment, muscle tension, nausea, obsessions, shortness of breath and weight loss.  Severity: moderate   Sleep quality: fair  Nighttime awakenings: none  Patient has a history of: anxiety/panic attacks and depression  No history of: anemia, arrhythmia, asthma, bipolar disorder, CHF, chronic lung disease and fibromyalgia  Treatment tried: SSRI  Compliance with treatment: variable  Improvement on treatment: moderate   Migraine   This is a chronic problem. The current episode started more than 1 year ago. The problem occurs constantly. The problem has been unchanged. The quality of the pain is described as aching. Pertinent negatives include no blurred vision, coughing, fever, insomnia, nausea, numbness, rhinorrhea, sinus pressure, sore throat, vomiting, weakness or weight loss. The symptoms are aggravated by activity. Treatments tried: imitrex  The treatment provided mild relief. His past medical history is significant for hypertension and obesity. There is no history of cancer, cluster headaches, immunosuppression, migraine headaches, migraines in the family or pseudotumor cerebri. (Oligospermia, hyopgonadism )   Male  Problem  This is a recurrent problem. The problem  occurs constantly. The problem has been unchanged. Pertinent negatives include no chest pain, chills, coughing, diarrhea, fever, headaches, nausea, shortness of breath, sore throat or vomiting. There is no history of BPH, chlamydia, cryptorchidism, erectile aid use, erectile dysfunction, a femoral hernia, gonorrhea, herpes simplex, HIV, an inguinal hernia, kidney stones, prostatitis, sickle cell disease, syphilis or varicocele. (Oligospermia, hyopgonadism )   Obesity  This is a chronic problem. The current episode started more than 1 year ago. The problem occurs constantly. The problem has been unchanged. Associated symptoms include arthralgias and fatigue. Pertinent negatives include no chest pain, chills, congestion, coughing, diaphoresis, fever, headaches, nausea, numbness, sore throat, vomiting or weakness. Nothing aggravates the symptoms. The treatment provided no relief.     Review of Systems  Review of Systems   Constitutional: Positive for activity change and fatigue. Negative for appetite change, chills, diaphoresis and fever.   HENT: Negative for congestion, postnasal drip, rhinorrhea, sinus pressure, sinus pain, sneezing, sore throat, trouble swallowing and voice change.    Respiratory: Negative for cough, choking, chest tightness, shortness of breath, wheezing and stridor.    Cardiovascular: Negative for chest pain.   Gastrointestinal: Negative for abdominal pain, anorexia, change in bowel habit, diarrhea, nausea and vomiting.   Musculoskeletal: Negative for arthralgias, joint swelling, myalgias and neck pain.   Skin: Negative for rash.   Neurological: Positive for weakness, numbness and headaches. Negative for vertigo.   Psychiatric/Behavioral: The patient is nervous/anxious.         Depressed mood        Patient Active Problem List   Diagnosis   • Hypogonadism in male   • Oligospermia   • Essential hypertension   • Class 2 severe obesity with serious comorbidity and body mass index (BMI) of 35.0 to  35.9 in adult ([facility])   • Migraine without status migrainosus, not intractable   • Moderate episode of recurrent major depressive disorder ([facility])   • Prediabetes   • Mixed hyperlipidemia   • Controlled type 2 diabetes mellitus with complication, without long-term current use of insulin ([facility])   • CATA (generalized anxiety disorder)     History reviewed. No pertinent surgical history.  Social History     Socioeconomic History   • Marital status:    Tobacco Use   • Smoking status: Never Smoker   • Smokeless tobacco: Never Used   Vaping Use   • Vaping Use: Never used   Substance and Sexual Activity   • Alcohol use: Not Currently   • Drug use: Not Currently   • Sexual activity: Defer     Family History   Problem Relation Age of Onset   • Diabetes Mother      Lab on 08/22/2022   Component Date Value Ref Range Status   • WBC 08/22/2022 6.22  3.40 - 10.80 10*3/mm3 Final   • RBC 08/22/2022 4.93  4.14 - 5.80 10*6/mm3 Final   • Hemoglobin 08/22/2022 14.5  13.0 - 17.7 g/dL Final   • Hematocrit 08/22/2022 43.6  37.5 - 51.0 % Final   • MCV 08/22/2022 88.4  79.0 - 97.0 fL Final   • MCH 08/22/2022 29.4  26.6 - 33.0 pg Final   • MCHC 08/22/2022 33.3  31.5 - 35.7 g/dL Final   • RDW 08/22/2022 13.1  12.3 - 15.4 % Final   • RDW-SD 08/22/2022 42.2  37.0 - 54.0 fl Final   • MPV 08/22/2022 9.8  6.0 - 12.0 fL Final   • Platelets 08/22/2022 221  140 - 450 10*3/mm3 Final   • Neutrophil % 08/22/2022 52.4  42.7 - 76.0 % Final   • Lymphocyte % 08/22/2022 38.7  19.6 - 45.3 % Final   • Monocyte % 08/22/2022 6.6  5.0 - 12.0 % Final   • Eosinophil % 08/22/2022 1.3  0.3 - 6.2 % Final   • Basophil % 08/22/2022 0.8  0.0 - 1.5 % Final   • Immature Grans % 08/22/2022 0.2  0.0 - 0.5 % Final   • Neutrophils, Absolute 08/22/2022 3.26  1.70 - 7.00 10*3/mm3 Final   • Lymphocytes, Absolute 08/22/2022 2.41  0.70 - 3.10 10*3/mm3 Final   • Monocytes, Absolute 08/22/2022 0.41  0.10 - 0.90 10*3/mm3 Final   • Eosinophils, Absolute 08/22/2022 0.08  0.00 - 0.40  10*3/mm3 Final   • Basophils, Absolute 08/22/2022 0.05  0.00 - 0.20 10*3/mm3 Final   • Immature Grans, Absolute 08/22/2022 0.01  0.00 - 0.05 10*3/mm3 Final   • nRBC 08/22/2022 0.0  0.0 - 0.2 /100 WBC Final   • Glucose 08/22/2022 104 (A) 65 - 99 mg/dL Final   • BUN 08/22/2022 19  6 - 20 mg/dL Final   • Creatinine 08/22/2022 1.22  0.76 - 1.27 mg/dL Final   • Sodium 08/22/2022 142  136 - 145 mmol/L Final   • Potassium 08/22/2022 4.4  3.5 - 5.2 mmol/L Final   • Chloride 08/22/2022 105  98 - 107 mmol/L Final   • CO2 08/22/2022 27.1  22.0 - 29.0 mmol/L Final   • Calcium 08/22/2022 9.1  8.6 - 10.5 mg/dL Final   • Total Protein 08/22/2022 6.9  6.0 - 8.5 g/dL Final   • Albumin 08/22/2022 4.40  3.50 - 5.20 g/dL Final   • ALT (SGPT) 08/22/2022 33  1 - 41 U/L Final   • AST (SGOT) 08/22/2022 19  1 - 40 U/L Final   • Alkaline Phosphatase 08/22/2022 79  39 - 117 U/L Final   • Total Bilirubin 08/22/2022 0.3  0.0 - 1.2 mg/dL Final   • Globulin 08/22/2022 2.5  gm/dL Final   • A/G Ratio 08/22/2022 1.8  g/dL Final   • BUN/Creatinine Ratio 08/22/2022 15.6  7.0 - 25.0 Final   • Anion Gap 08/22/2022 9.9  5.0 - 15.0 mmol/L Final   • eGFR 08/22/2022 73.6  >60.0 mL/min/1.73 Final    National Kidney Foundation and American Society of Nephrology (ASN) Task Force recommended calculation based on the Chronic Kidney Disease Epidemiology Collaboration (CKD-EPI) equation refit without adjustment for race.   • Hemoglobin A1C 08/22/2022 6.40 (A) 4.80 - 5.60 % Final   • Total Cholesterol 08/22/2022 130  0 - 200 mg/dL Final   • Triglycerides 08/22/2022 121  0 - 150 mg/dL Final   • HDL Cholesterol 08/22/2022 36 (A) 40 - 60 mg/dL Final   • LDL Cholesterol  08/22/2022 72  0 - 100 mg/dL Final   • VLDL Cholesterol 08/22/2022 22  5 - 40 mg/dL Final   • LDL/HDL Ratio 08/22/2022 1.94   Final   • 25 Hydroxy, Vitamin D 08/22/2022 51.9  30.0 - 100.0 ng/ml Final   • Vitamin B-12 08/22/2022 763  211 - 946 pg/mL Final   • Microalbumin/Creatinine Ratio 08/22/2022 5.2   mg/g Final   • Creatinine, Urine 08/22/2022 309.9  mg/dL Final   • Microalbumin, Urine 08/22/2022 1.6  mg/dL Final   Lab on 04/21/2022   Component Date Value Ref Range Status   • WBC 04/21/2022 5.66  3.40 - 10.80 10*3/mm3 Final   • RBC 04/21/2022 5.15  4.14 - 5.80 10*6/mm3 Final   • Hemoglobin 04/21/2022 15.3  13.0 - 17.7 g/dL Final   • Hematocrit 04/21/2022 43.5  37.5 - 51.0 % Final   • MCV 04/21/2022 84.5  79.0 - 97.0 fL Final   • MCH 04/21/2022 29.7  26.6 - 33.0 pg Final   • MCHC 04/21/2022 35.2  31.5 - 35.7 g/dL Final   • RDW 04/21/2022 12.8  12.3 - 15.4 % Final   • RDW-SD 04/21/2022 38.9  37.0 - 54.0 fl Final   • MPV 04/21/2022 9.6  6.0 - 12.0 fL Final   • Platelets 04/21/2022 225  140 - 450 10*3/mm3 Final   • Neutrophil % 04/21/2022 54.1  42.7 - 76.0 % Final   • Lymphocyte % 04/21/2022 34.6  19.6 - 45.3 % Final   • Monocyte % 04/21/2022 8.8  5.0 - 12.0 % Final   • Eosinophil % 04/21/2022 1.4  0.3 - 6.2 % Final   • Basophil % 04/21/2022 0.7  0.0 - 1.5 % Final   • Immature Grans % 04/21/2022 0.4  0.0 - 0.5 % Final   • Neutrophils, Absolute 04/21/2022 3.06  1.70 - 7.00 10*3/mm3 Final   • Lymphocytes, Absolute 04/21/2022 1.96  0.70 - 3.10 10*3/mm3 Final   • Monocytes, Absolute 04/21/2022 0.50  0.10 - 0.90 10*3/mm3 Final   • Eosinophils, Absolute 04/21/2022 0.08  0.00 - 0.40 10*3/mm3 Final   • Basophils, Absolute 04/21/2022 0.04  0.00 - 0.20 10*3/mm3 Final   • Immature Grans, Absolute 04/21/2022 0.02  0.00 - 0.05 10*3/mm3 Final   • nRBC 04/21/2022 0.0  0.0 - 0.2 /100 WBC Final   • Glucose 04/21/2022 109 (A) 65 - 99 mg/dL Final   • BUN 04/21/2022 22 (A) 6 - 20 mg/dL Final   • Creatinine 04/21/2022 1.25  0.76 - 1.27 mg/dL Final   • Sodium 04/21/2022 140  136 - 145 mmol/L Final   • Potassium 04/21/2022 4.3  3.5 - 5.2 mmol/L Final   • Chloride 04/21/2022 103  98 - 107 mmol/L Final   • CO2 04/21/2022 25.4  22.0 - 29.0 mmol/L Final   • Calcium 04/21/2022 9.8  8.6 - 10.5 mg/dL Final   • Total Protein 04/21/2022 7.7  6.0  "- 8.5 g/dL Final   • Albumin 04/21/2022 4.80  3.50 - 5.20 g/dL Final   • ALT (SGPT) 04/21/2022 41  1 - 41 U/L Final   • AST (SGOT) 04/21/2022 28  1 - 40 U/L Final   • Alkaline Phosphatase 04/21/2022 76  39 - 117 U/L Final   • Total Bilirubin 04/21/2022 0.4  0.0 - 1.2 mg/dL Final   • Globulin 04/21/2022 2.9  gm/dL Final   • A/G Ratio 04/21/2022 1.7  g/dL Final   • BUN/Creatinine Ratio 04/21/2022 17.6  7.0 - 25.0 Final   • Anion Gap 04/21/2022 11.6  5.0 - 15.0 mmol/L Final   • eGFR 04/21/2022 71.5  >60.0 mL/min/1.73 Final    National Kidney Foundation and American Society of Nephrology (ASN) Task Force recommended calculation based on the Chronic Kidney Disease Epidemiology Collaboration (CKD-EPI) equation refit without adjustment for race.   • Hemoglobin A1C 04/21/2022 6.50 (A) 4.80 - 5.60 % Final   • Total Cholesterol 04/21/2022 204 (A) 0 - 200 mg/dL Final   • Triglycerides 04/21/2022 161 (A) 0 - 150 mg/dL Final   • HDL Cholesterol 04/21/2022 35 (A) 40 - 60 mg/dL Final   • LDL Cholesterol  04/21/2022 140 (A) 0 - 100 mg/dL Final   • VLDL Cholesterol 04/21/2022 29  5 - 40 mg/dL Final   • LDL/HDL Ratio 04/21/2022 3.91   Final   • 25 Hydroxy, Vitamin D 04/21/2022 32.4  30.0 - 100.0 ng/ml Final   • Vitamin B-12 04/21/2022 677  211 - 946 pg/mL Final      No image results found.    [unfilled]  Immunization History   Administered Date(s) Administered   • COVID-19 (MODERNA) 1st, 2nd, 3rd Dose Only 04/22/2021, 05/18/2021   • FluLaval/Fluzone >6mos 09/23/2021       The following portions of the patient's history were reviewed and updated as appropriate: allergies, current medications, past family history, past medical history, past social history, past surgical history and problem list.        Physical Exam  /80 (BP Location: Left arm, Patient Position: Sitting, Cuff Size: Adult)   Pulse 88   Temp 97.8 °F (36.6 °C)   Ht 177.8 cm (70\")   Wt 112 kg (246 lb)   SpO2 98%   BMI 35.30 kg/m²     Physical Exam  Vitals " and nursing note reviewed.   Constitutional:       Appearance: He is well-developed. He is not diaphoretic.   HENT:      Head: Normocephalic and atraumatic.      Right Ear: External ear normal.   Eyes:      Conjunctiva/sclera: Conjunctivae normal.      Pupils: Pupils are equal, round, and reactive to light.   Cardiovascular:      Rate and Rhythm: Normal rate and regular rhythm.      Heart sounds: Normal heart sounds. No murmur heard.  Pulmonary:      Effort: Pulmonary effort is normal. No respiratory distress.      Breath sounds: Normal breath sounds.   Abdominal:      General: Bowel sounds are normal. There is no distension.      Palpations: Abdomen is soft.      Tenderness: There is no abdominal tenderness.      Comments: Obese abdomen    Musculoskeletal:         General: Tenderness present. No deformity. Normal range of motion.      Cervical back: Normal range of motion and neck supple.   Skin:     General: Skin is warm.      Coloration: Skin is not pale.      Findings: No erythema or rash.   Neurological:      Mental Status: He is alert and oriented to person, place, and time.      Cranial Nerves: No cranial nerve deficit.   Psychiatric:         Behavior: Behavior normal.         [unfilled]   Diagnosis Plan   1. Controlled type 2 diabetes mellitus with complication, without long-term current use of insulin (HCC)  CBC Auto Differential    Comprehensive Metabolic Panel    Hemoglobin A1c    Lipid Panel    Vitamin D 25 Hydroxy    Vitamin B12   2. Mixed hyperlipidemia  CBC Auto Differential    Comprehensive Metabolic Panel    Hemoglobin A1c    Lipid Panel    Vitamin D 25 Hydroxy    Vitamin B12   3. Moderate episode of recurrent major depressive disorder (HCC)  CBC Auto Differential    Comprehensive Metabolic Panel    Hemoglobin A1c    Lipid Panel    Vitamin D 25 Hydroxy    Vitamin B12   4. Migraine without status migrainosus, not intractable, unspecified migraine type  CBC Auto Differential    Comprehensive  Metabolic Panel    Hemoglobin A1c    Lipid Panel    Vitamin D 25 Hydroxy    Vitamin B12   5. Essential hypertension  CBC Auto Differential    Comprehensive Metabolic Panel    Hemoglobin A1c    Lipid Panel    Vitamin D 25 Hydroxy    Vitamin B12   6. CATA (generalized anxiety disorder)  CBC Auto Differential    Comprehensive Metabolic Panel    Hemoglobin A1c    Lipid Panel    Vitamin D 25 Hydroxy    Vitamin B12   7. Erectile dysfunction, unspecified erectile dysfunction type  CBC Auto Differential    Comprehensive Metabolic Panel    Hemoglobin A1c    Lipid Panel    Vitamin D 25 Hydroxy    Vitamin B12   8. Stage 2 chronic kidney disease  CBC Auto Differential    Comprehensive Metabolic Panel    Hemoglobin A1c    Lipid Panel    Vitamin D 25 Hydroxy    Vitamin B12   9. Class 2 severe obesity with serious comorbidity and body mass index (BMI) of 35.0 to 35.9 in adult, unspecified obesity type (HCC)  CBC Auto Differential    Comprehensive Metabolic Panel    Hemoglobin A1c    Lipid Panel    Vitamin D 25 Hydroxy    Vitamin B12           -went over labwork   -recommend tdap vaccination  -recommend COVID-19 vaccination  -[facility]   -recommend colonoscopy screening - [facility]   -ckd stage 2 - gave information. Continue to monitor   -DM type 2 - on metformin 1000 mg PO q daily.    -WILLA on cpap   -multiple joint pain - on ibuprofen PRN, on robaxin at bedtime. Will refer to chiorpractor   -obesity - counseled weight loss >5 mintues BMI at 35.30  -HTN - on lisinopril on norvasc  -HLP - recommend heart healthy diet.  on lipitor 20 mg PO qhs   -major depression/PTSD/CATA - on zoloft 100 mg daily on vistaril PRN on remeron 15 mg PO q daily.   -migraine - imitrex PRN   -ED - on cialis PRN   -oligospermia/hyopogonadism/low testosterone - was seeing Endocirnology at [facility]   -advised pt to be safe and call with questions and concerns  -advised pt to go to ER or call 911 if symptoms worrisome or  severe  -advised pt to followup with specialist and referrals  -advised pt to be safe during COVID-19 pandemic  I spent 45 minutes caring for Hank on this date of service. This time includes time spent by me in the following activities: preparing for the visit, reviewing tests, obtaining and/or reviewing a separately obtained history, performing a medically appropriate examination and/or evaluation, counseling and educating the patient/family/caregiver, ordering medications, tests, or procedures, referring and communicating with other health care professionals, documenting information in the medical record, independently interpreting results and communicating that information with the patient/family/caregiver and care coordination.  -recheck in 3 months         This document has been electronically signed by Clarence Sadler MD on August 25, 2022 10:50 CDT

## 2022-08-22 ENCOUNTER — LAB (OUTPATIENT)
Dept: LAB | Facility: HOSPITAL | Age: 47
End: 2022-08-22

## 2022-08-22 DIAGNOSIS — E78.2 MIXED HYPERLIPIDEMIA: ICD-10-CM

## 2022-08-22 DIAGNOSIS — I10 ESSENTIAL HYPERTENSION: ICD-10-CM

## 2022-08-22 DIAGNOSIS — E11.8 CONTROLLED TYPE 2 DIABETES MELLITUS WITH COMPLICATION, WITHOUT LONG-TERM CURRENT USE OF INSULIN: ICD-10-CM

## 2022-08-22 LAB
25(OH)D3 SERPL-MCNC: 51.9 NG/ML (ref 30–100)
ALBUMIN SERPL-MCNC: 4.4 G/DL (ref 3.5–5.2)
ALBUMIN UR-MCNC: 1.6 MG/DL
ALBUMIN/GLOB SERPL: 1.8 G/DL
ALP SERPL-CCNC: 79 U/L (ref 39–117)
ALT SERPL W P-5'-P-CCNC: 33 U/L (ref 1–41)
ANION GAP SERPL CALCULATED.3IONS-SCNC: 9.9 MMOL/L (ref 5–15)
AST SERPL-CCNC: 19 U/L (ref 1–40)
BASOPHILS # BLD AUTO: 0.05 10*3/MM3 (ref 0–0.2)
BASOPHILS NFR BLD AUTO: 0.8 % (ref 0–1.5)
BILIRUB SERPL-MCNC: 0.3 MG/DL (ref 0–1.2)
BUN SERPL-MCNC: 19 MG/DL (ref 6–20)
BUN/CREAT SERPL: 15.6 (ref 7–25)
CALCIUM SPEC-SCNC: 9.1 MG/DL (ref 8.6–10.5)
CHLORIDE SERPL-SCNC: 105 MMOL/L (ref 98–107)
CHOLEST SERPL-MCNC: 130 MG/DL (ref 0–200)
CO2 SERPL-SCNC: 27.1 MMOL/L (ref 22–29)
CREAT SERPL-MCNC: 1.22 MG/DL (ref 0.76–1.27)
CREAT UR-MCNC: 309.9 MG/DL
DEPRECATED RDW RBC AUTO: 42.2 FL (ref 37–54)
EGFRCR SERPLBLD CKD-EPI 2021: 73.6 ML/MIN/1.73
EOSINOPHIL # BLD AUTO: 0.08 10*3/MM3 (ref 0–0.4)
EOSINOPHIL NFR BLD AUTO: 1.3 % (ref 0.3–6.2)
ERYTHROCYTE [DISTWIDTH] IN BLOOD BY AUTOMATED COUNT: 13.1 % (ref 12.3–15.4)
GLOBULIN UR ELPH-MCNC: 2.5 GM/DL
GLUCOSE SERPL-MCNC: 104 MG/DL (ref 65–99)
HBA1C MFR BLD: 6.4 % (ref 4.8–5.6)
HCT VFR BLD AUTO: 43.6 % (ref 37.5–51)
HDLC SERPL-MCNC: 36 MG/DL (ref 40–60)
HGB BLD-MCNC: 14.5 G/DL (ref 13–17.7)
IMM GRANULOCYTES # BLD AUTO: 0.01 10*3/MM3 (ref 0–0.05)
IMM GRANULOCYTES NFR BLD AUTO: 0.2 % (ref 0–0.5)
LDLC SERPL CALC-MCNC: 72 MG/DL (ref 0–100)
LDLC/HDLC SERPL: 1.94 {RATIO}
LYMPHOCYTES # BLD AUTO: 2.41 10*3/MM3 (ref 0.7–3.1)
LYMPHOCYTES NFR BLD AUTO: 38.7 % (ref 19.6–45.3)
MCH RBC QN AUTO: 29.4 PG (ref 26.6–33)
MCHC RBC AUTO-ENTMCNC: 33.3 G/DL (ref 31.5–35.7)
MCV RBC AUTO: 88.4 FL (ref 79–97)
MICROALBUMIN/CREAT UR: 5.2 MG/G
MONOCYTES # BLD AUTO: 0.41 10*3/MM3 (ref 0.1–0.9)
MONOCYTES NFR BLD AUTO: 6.6 % (ref 5–12)
NEUTROPHILS NFR BLD AUTO: 3.26 10*3/MM3 (ref 1.7–7)
NEUTROPHILS NFR BLD AUTO: 52.4 % (ref 42.7–76)
NRBC BLD AUTO-RTO: 0 /100 WBC (ref 0–0.2)
PLATELET # BLD AUTO: 221 10*3/MM3 (ref 140–450)
PMV BLD AUTO: 9.8 FL (ref 6–12)
POTASSIUM SERPL-SCNC: 4.4 MMOL/L (ref 3.5–5.2)
PROT SERPL-MCNC: 6.9 G/DL (ref 6–8.5)
RBC # BLD AUTO: 4.93 10*6/MM3 (ref 4.14–5.8)
SODIUM SERPL-SCNC: 142 MMOL/L (ref 136–145)
TRIGL SERPL-MCNC: 121 MG/DL (ref 0–150)
VIT B12 BLD-MCNC: 763 PG/ML (ref 211–946)
VLDLC SERPL-MCNC: 22 MG/DL (ref 5–40)
WBC NRBC COR # BLD: 6.22 10*3/MM3 (ref 3.4–10.8)

## 2022-08-22 PROCEDURE — 80053 COMPREHEN METABOLIC PANEL: CPT

## 2022-08-22 PROCEDURE — 82043 UR ALBUMIN QUANTITATIVE: CPT

## 2022-08-22 PROCEDURE — 83036 HEMOGLOBIN GLYCOSYLATED A1C: CPT

## 2022-08-22 PROCEDURE — 85025 COMPLETE CBC W/AUTO DIFF WBC: CPT

## 2022-08-22 PROCEDURE — 82570 ASSAY OF URINE CREATININE: CPT

## 2022-08-22 PROCEDURE — 82306 VITAMIN D 25 HYDROXY: CPT

## 2022-08-22 PROCEDURE — 82607 VITAMIN B-12: CPT

## 2022-08-22 PROCEDURE — 80061 LIPID PANEL: CPT

## 2022-08-23 ENCOUNTER — TELEPHONE (OUTPATIENT)
Dept: FAMILY MEDICINE CLINIC | Facility: CLINIC | Age: 47
End: 2022-08-23

## 2022-08-23 NOTE — TELEPHONE ENCOUNTER
----- Message from Clarence Sadler MD sent at 8/22/2022  8:24 PM CDT -----  Please let pt know that labwork stable with hga1c at 6.40  if pt agreeable go up on metformin form 1000 mg daily to BID give 60 pills and 3 refills     Kidney function stable.    On lipid panel HDL low recommend heart healthy diet    Recheck on next visit thanks

## 2022-08-25 ENCOUNTER — OFFICE VISIT (OUTPATIENT)
Dept: FAMILY MEDICINE CLINIC | Facility: CLINIC | Age: 47
End: 2022-08-25

## 2022-08-25 VITALS
OXYGEN SATURATION: 98 % | HEIGHT: 70 IN | TEMPERATURE: 97.8 F | HEART RATE: 88 BPM | WEIGHT: 246 LBS | SYSTOLIC BLOOD PRESSURE: 130 MMHG | DIASTOLIC BLOOD PRESSURE: 80 MMHG | BODY MASS INDEX: 35.22 KG/M2

## 2022-08-25 DIAGNOSIS — F33.1 MODERATE EPISODE OF RECURRENT MAJOR DEPRESSIVE DISORDER: ICD-10-CM

## 2022-08-25 DIAGNOSIS — E11.8 CONTROLLED TYPE 2 DIABETES MELLITUS WITH COMPLICATION, WITHOUT LONG-TERM CURRENT USE OF INSULIN: Primary | ICD-10-CM

## 2022-08-25 DIAGNOSIS — N52.9 ERECTILE DYSFUNCTION, UNSPECIFIED ERECTILE DYSFUNCTION TYPE: ICD-10-CM

## 2022-08-25 DIAGNOSIS — E66.01 CLASS 2 SEVERE OBESITY WITH SERIOUS COMORBIDITY AND BODY MASS INDEX (BMI) OF 35.0 TO 35.9 IN ADULT, UNSPECIFIED OBESITY TYPE: ICD-10-CM

## 2022-08-25 DIAGNOSIS — F41.1 GAD (GENERALIZED ANXIETY DISORDER): ICD-10-CM

## 2022-08-25 DIAGNOSIS — E78.2 MIXED HYPERLIPIDEMIA: ICD-10-CM

## 2022-08-25 DIAGNOSIS — N18.2 STAGE 2 CHRONIC KIDNEY DISEASE: ICD-10-CM

## 2022-08-25 DIAGNOSIS — I10 ESSENTIAL HYPERTENSION: ICD-10-CM

## 2022-08-25 DIAGNOSIS — G43.909 MIGRAINE WITHOUT STATUS MIGRAINOSUS, NOT INTRACTABLE, UNSPECIFIED MIGRAINE TYPE: ICD-10-CM

## 2022-08-25 PROCEDURE — 99214 OFFICE O/P EST MOD 30 MIN: CPT | Performed by: FAMILY MEDICINE

## 2022-08-25 RX ORDER — IBUPROFEN 600 MG/1
600 TABLET ORAL EVERY 6 HOURS PRN
Qty: 90 TABLET | Refills: 1 | Status: SHIPPED | OUTPATIENT
Start: 2022-08-25 | End: 2022-11-28 | Stop reason: SDUPTHER

## 2022-08-25 RX ORDER — SERTRALINE HYDROCHLORIDE 100 MG/1
100 TABLET, FILM COATED ORAL DAILY
Qty: 90 TABLET | Refills: 1 | Status: SHIPPED | OUTPATIENT
Start: 2022-08-25 | End: 2022-11-21 | Stop reason: SDUPTHER

## 2022-08-25 RX ORDER — LISINOPRIL 20 MG/1
20 TABLET ORAL DAILY
Qty: 90 TABLET | Refills: 1 | Status: SHIPPED | OUTPATIENT
Start: 2022-08-25 | End: 2022-11-28 | Stop reason: SDUPTHER

## 2022-08-25 RX ORDER — PROPYLENE GLYCOL 0.06 MG/ML
1 SOLUTION/ DROPS OPHTHALMIC 2 TIMES DAILY PRN
Qty: 15 ML | Refills: 3 | Status: SHIPPED | OUTPATIENT
Start: 2022-08-25

## 2022-08-25 RX ORDER — METHOCARBAMOL 500 MG/1
500 TABLET, FILM COATED ORAL 4 TIMES DAILY
Qty: 90 TABLET | Refills: 1 | Status: CANCELLED | OUTPATIENT
Start: 2022-08-25

## 2022-08-25 RX ORDER — METHOCARBAMOL 500 MG/1
500 TABLET, FILM COATED ORAL DAILY
Qty: 90 TABLET | Refills: 1 | Status: SHIPPED | OUTPATIENT
Start: 2022-08-25 | End: 2022-11-21 | Stop reason: SDUPTHER

## 2022-08-25 RX ORDER — SUMATRIPTAN 100 MG/1
100 TABLET, FILM COATED ORAL
Qty: 90 TABLET | Refills: 1 | Status: SHIPPED | OUTPATIENT
Start: 2022-08-25 | End: 2022-11-21 | Stop reason: SDUPTHER

## 2022-08-25 RX ORDER — ERGOCALCIFEROL 1.25 MG/1
50000 CAPSULE ORAL
Qty: 12 CAPSULE | Refills: 0 | Status: SHIPPED | OUTPATIENT
Start: 2022-08-25 | End: 2022-11-21 | Stop reason: SDUPTHER

## 2022-10-19 RX ORDER — ATORVASTATIN CALCIUM 20 MG/1
20 TABLET, FILM COATED ORAL NIGHTLY
Qty: 90 TABLET | Refills: 0 | Status: SHIPPED | OUTPATIENT
Start: 2022-10-19

## 2022-11-21 RX ORDER — MIRTAZAPINE 15 MG/1
15 TABLET, FILM COATED ORAL NIGHTLY
Qty: 30 TABLET | Refills: 3 | Status: SHIPPED | OUTPATIENT
Start: 2022-11-21

## 2022-11-21 RX ORDER — ERGOCALCIFEROL 1.25 MG/1
50000 CAPSULE ORAL
Qty: 12 CAPSULE | Refills: 0 | Status: SHIPPED | OUTPATIENT
Start: 2022-11-21

## 2022-11-21 RX ORDER — METHOCARBAMOL 500 MG/1
500 TABLET, FILM COATED ORAL DAILY
Qty: 90 TABLET | Refills: 1 | Status: SHIPPED | OUTPATIENT
Start: 2022-11-21

## 2022-11-21 RX ORDER — PRAZOSIN HYDROCHLORIDE 5 MG/1
5 CAPSULE ORAL NIGHTLY
Qty: 30 CAPSULE | Refills: 3 | Status: SHIPPED | OUTPATIENT
Start: 2022-11-21

## 2022-11-21 RX ORDER — SUMATRIPTAN 100 MG/1
100 TABLET, FILM COATED ORAL
Qty: 90 TABLET | Refills: 1 | Status: SHIPPED | OUTPATIENT
Start: 2022-11-21

## 2022-11-21 RX ORDER — DIPHENOXYLATE HYDROCHLORIDE AND ATROPINE SULFATE 2.5; .025 MG/1; MG/1
1 TABLET ORAL DAILY
Qty: 30 TABLET | Refills: 3 | Status: SHIPPED | OUTPATIENT
Start: 2022-11-21

## 2022-11-21 RX ORDER — SERTRALINE HYDROCHLORIDE 100 MG/1
100 TABLET, FILM COATED ORAL DAILY
Qty: 90 TABLET | Refills: 1 | Status: SHIPPED | OUTPATIENT
Start: 2022-11-21

## 2022-11-21 RX ORDER — HYDROXYZINE HYDROCHLORIDE 25 MG/1
25 TABLET, FILM COATED ORAL EVERY 8 HOURS PRN
Qty: 90 TABLET | Refills: 3 | Status: SHIPPED | OUTPATIENT
Start: 2022-11-21

## 2022-11-29 RX ORDER — LISINOPRIL 20 MG/1
20 TABLET ORAL DAILY
Qty: 90 TABLET | Refills: 0 | Status: SHIPPED | OUTPATIENT
Start: 2022-11-29

## 2022-11-29 RX ORDER — IBUPROFEN 600 MG/1
600 TABLET ORAL EVERY 6 HOURS PRN
Qty: 90 TABLET | Refills: 0 | Status: SHIPPED | OUTPATIENT
Start: 2022-11-29

## 2023-02-21 ENCOUNTER — TELEPHONE (OUTPATIENT)
Dept: FAMILY MEDICINE CLINIC | Facility: CLINIC | Age: 48
End: 2023-02-21
Payer: OTHER GOVERNMENT